# Patient Record
Sex: FEMALE | Race: WHITE | NOT HISPANIC OR LATINO | Employment: FULL TIME
[De-identification: names, ages, dates, MRNs, and addresses within clinical notes are randomized per-mention and may not be internally consistent; named-entity substitution may affect disease eponyms.]

---

## 2017-10-15 ENCOUNTER — HEALTH MAINTENANCE LETTER (OUTPATIENT)
Age: 27
End: 2017-10-15

## 2018-09-06 ENCOUNTER — TELEPHONE (OUTPATIENT)
Dept: FAMILY MEDICINE | Facility: CLINIC | Age: 28
End: 2018-09-06

## 2018-09-06 NOTE — TELEPHONE ENCOUNTER
Patient is calling stating just picked up records/copy of immunization record and upon getting home looked at the print out and only had 1 HPV shot for record. Would like to see if RN or care team can look into immunization record to see if that is correct or if poss missing pages. Please call to discuss. Thank you.

## 2018-09-06 NOTE — TELEPHONE ENCOUNTER
Got immunizations off of [facility], put in chart. Called patient and informed her what we had from [facility]. She is looking for her MMR and chicken pox vaccines for her employment. She will keep checking to get dates.Fozia Orellana MA/JAY

## 2018-10-22 ENCOUNTER — HEALTH MAINTENANCE LETTER (OUTPATIENT)
Age: 28
End: 2018-10-22

## 2019-11-04 ENCOUNTER — OFFICE VISIT (OUTPATIENT)
Dept: OPTOMETRY | Facility: CLINIC | Age: 29
End: 2019-11-04
Payer: COMMERCIAL

## 2019-11-04 DIAGNOSIS — Z01.00 ENCOUNTER FOR EXAMINATION OF EYES AND VISION WITHOUT ABNORMAL FINDINGS: Primary | ICD-10-CM

## 2019-11-04 DIAGNOSIS — Z46.0 CONTACT LENS/GLASSES FITTING: ICD-10-CM

## 2019-11-04 DIAGNOSIS — H52.13 MYOPIA OF BOTH EYES: ICD-10-CM

## 2019-11-04 PROCEDURE — 92310 CONTACT LENS FITTING OU: CPT | Mod: GA | Performed by: OPTOMETRIST

## 2019-11-04 PROCEDURE — 92004 COMPRE OPH EXAM NEW PT 1/>: CPT | Performed by: OPTOMETRIST

## 2019-11-04 PROCEDURE — 92015 DETERMINE REFRACTIVE STATE: CPT | Performed by: OPTOMETRIST

## 2019-11-04 ASSESSMENT — SLIT LAMP EXAM - LIDS
COMMENTS: NORMAL
COMMENTS: NORMAL

## 2019-11-04 ASSESSMENT — VISUAL ACUITY
OS_SC: 20/20
OD_SC: 20/50
OD_CC: 20/20
OS_SC: 20/70
OD_SC+: -2
METHOD: SNELLEN - LINEAR
OS_CC: 20/20
OD_CC: 20/20
OS_SC+: -2
OS_CC: 20/20
OD_SC: 20/20 -1
CORRECTION_TYPE: GLASSES

## 2019-11-04 ASSESSMENT — REFRACTION_CURRENTRX
OD_BRAND: J&J ACUVUE OASYS BC 8.4, D 14.0
OD_SPHERE: -1.00
OS_BRAND: J&J ACUVUE OASYS BC 8.4, D 14.0
OS_SPHERE: -1.25

## 2019-11-04 ASSESSMENT — REFRACTION_MANIFEST
OD_CYLINDER: SPHERE
OD_SPHERE: -1.00
OS_SPHERE: -1.25
OS_CYLINDER: SPHERE

## 2019-11-04 ASSESSMENT — REFRACTION_WEARINGRX
OD_SPHERE: -0.75
OS_SPHERE: -1.00
SPECS_TYPE: SVL
OD_CYLINDER: SPHERE
OS_CYLINDER: SPHERE

## 2019-11-04 ASSESSMENT — TONOMETRY
OD_IOP_MMHG: 13
OS_IOP_MMHG: 13
IOP_METHOD: APPLANATION

## 2019-11-04 ASSESSMENT — CONF VISUAL FIELD
OD_NORMAL: 1
OS_NORMAL: 1

## 2019-11-04 ASSESSMENT — EXTERNAL EXAM - RIGHT EYE: OD_EXAM: NORMAL

## 2019-11-04 ASSESSMENT — EXTERNAL EXAM - LEFT EYE: OS_EXAM: NORMAL

## 2019-11-04 NOTE — PATIENT INSTRUCTIONS
Begin trial of Acuvue Oasys lenses.   Do not sleep in contact lenses. Health risks discussed.  Do not swim in contact lenses . Health risks discussed.  Only wear contact lenses if they are comfortable and eyes are not red .  Use new contact solution in case every day.  Maximum contact wearing time of 12 hours per day.  If adequate comfort/vision with contact lenses, we can finalize the prescription. If not, return for contact lens follow-up appointment.     Copy of glasses Rx provided today.  Optional to fill- mild change.     Maintain annual eye exams.     The effects of the dilating drops last for 4- 6 hours.  You will be more sensitive to light and vision will be blurry up close.  Mydriatic sunglasses were given if needed.      Francisco Gill O.D.  [address]. [place]  [address]    [phone]

## 2019-11-04 NOTE — LETTER
11/4/2019         RE: Bettye Sagastume  [address] [place] [address]        Dear Colleague,    Thank you for referring your patient, Bettye Sagastume, to the [facility]. Please see a copy of my visit note below.    Chief Complaint   Patient presents with     Annual Eye Exam      Previous contact lens wearer? Yes  Comfort of contact lenses :Good, dry at times  Satisfied with current lenses: Yes, patient currently wears 2 week lens    Accompanied by self  Last Eye Exam: 2.5 years ago  Dilated Previously: Yes    What are you currently using to see?  Glasses-2.5 years old       Distance Vision Acuity: Satisfied with vision    Near Vision Acuity: Satisfied with vision while reading  unaided    Eye Comfort: good  Do you use eye drops? : No  Occupation or Hobbies: emergency room tech    Lila Eisenberg, Optometric Tech          Medical, surgical and family histories reviewed and updated 11/4/2019.       OBJECTIVE: See Ophthalmology exam    ASSESSMENT:    ICD-10-CM    1. Encounter for examination of eyes and vision without abnormal findings Z01.00    2. Myopia of both eyes H52.13    3. Contact lens/glasses fitting Z46.0       PLAN:     Patient Instructions   Begin trial of Acuvue Oasys lenses.   Do not sleep in contact lenses. Health risks discussed.  Do not swim in contact lenses . Health risks discussed.  Only wear contact lenses if they are comfortable and eyes are not red .  Use new contact solution in case every day.  Maximum contact wearing time of 12 hours per day.  If adequate comfort/vision with contact lenses, we can finalize the prescription. If not, return for contact lens follow-up appointment.     Copy of glasses Rx provided today.  Optional to fill- mild change.     Maintain annual eye exams.     The effects of the dilating drops last for 4- 6 hours.  You will be more sensitive to light and vision will be blurry up close.  Mydriatic sunglasses were given if needed.      Francisco ACKERMAN  TAYLER Gill.  [address]  [place]  [address]    [phone]           Again, thank you for allowing me to participate in the care of your patient.        Sincerely,        Francisco Gill, OD

## 2019-11-04 NOTE — PROGRESS NOTES
Chief Complaint   Patient presents with     Annual Eye Exam      Previous contact lens wearer? Yes  Comfort of contact lenses :Good, dry at times  Satisfied with current lenses: Yes, patient currently wears 2 week lens    Accompanied by self  Last Eye Exam: 2.5 years ago  Dilated Previously: Yes    What are you currently using to see?  Glasses-2.5 years old       Distance Vision Acuity: Satisfied with vision    Near Vision Acuity: Satisfied with vision while reading  unaided    Eye Comfort: good  Do you use eye drops? : No  Occupation or Hobbies: emergency room tech    Lila Eisenberg, Optometric Tech          Medical, surgical and family histories reviewed and updated 11/4/2019.       OBJECTIVE: See Ophthalmology exam    ASSESSMENT:    ICD-10-CM    1. Encounter for examination of eyes and vision without abnormal findings Z01.00    2. Myopia of both eyes H52.13    3. Contact lens/glasses fitting Z46.0       PLAN:     Patient Instructions   Begin trial of Acuvue Oasys lenses.   Do not sleep in contact lenses. Health risks discussed.  Do not swim in contact lenses . Health risks discussed.  Only wear contact lenses if they are comfortable and eyes are not red .  Use new contact solution in case every day.  Maximum contact wearing time of 12 hours per day.  If adequate comfort/vision with contact lenses, we can finalize the prescription. If not, return for contact lens follow-up appointment.     Copy of glasses Rx provided today.  Optional to fill- mild change.     Maintain annual eye exams.     The effects of the dilating drops last for 4- 6 hours.  You will be more sensitive to light and vision will be blurry up close.  Mydriatic sunglasses were given if needed.      Francisco Gill O.D.  [address] [place]  [address]    [phone]

## 2019-12-18 ENCOUNTER — PRENATAL OFFICE VISIT (OUTPATIENT)
Dept: OBGYN | Facility: OTHER | Age: 29
End: 2019-12-18
Payer: COMMERCIAL

## 2019-12-18 VITALS
SYSTOLIC BLOOD PRESSURE: 120 MMHG | BODY MASS INDEX: 23.88 KG/M2 | DIASTOLIC BLOOD PRESSURE: 70 MMHG | WEIGHT: 129.75 LBS | HEART RATE: 64 BPM | HEIGHT: 62 IN

## 2019-12-18 DIAGNOSIS — Z11.3 ROUTINE SCREENING FOR STI (SEXUALLY TRANSMITTED INFECTION): ICD-10-CM

## 2019-12-18 DIAGNOSIS — Z12.4 SCREENING FOR MALIGNANT NEOPLASM OF CERVIX: ICD-10-CM

## 2019-12-18 DIAGNOSIS — Z00.00 ROUTINE GENERAL MEDICAL EXAMINATION AT A HEALTH CARE FACILITY: Primary | ICD-10-CM

## 2019-12-18 PROCEDURE — 87591 N.GONORRHOEAE DNA AMP PROB: CPT | Performed by: ADVANCED PRACTICE MIDWIFE

## 2019-12-18 PROCEDURE — 87491 CHLMYD TRACH DNA AMP PROBE: CPT | Performed by: ADVANCED PRACTICE MIDWIFE

## 2019-12-18 PROCEDURE — 36415 COLL VENOUS BLD VENIPUNCTURE: CPT | Performed by: ADVANCED PRACTICE MIDWIFE

## 2019-12-18 PROCEDURE — 86780 TREPONEMA PALLIDUM: CPT | Performed by: ADVANCED PRACTICE MIDWIFE

## 2019-12-18 PROCEDURE — 87389 HIV-1 AG W/HIV-1&-2 AB AG IA: CPT | Performed by: ADVANCED PRACTICE MIDWIFE

## 2019-12-18 PROCEDURE — 99385 PREV VISIT NEW AGE 18-39: CPT | Performed by: ADVANCED PRACTICE MIDWIFE

## 2019-12-18 PROCEDURE — G0145 SCR C/V CYTO,THINLAYER,RESCR: HCPCS | Performed by: ADVANCED PRACTICE MIDWIFE

## 2019-12-18 PROCEDURE — 87624 HPV HI-RISK TYP POOLED RSLT: CPT | Performed by: ADVANCED PRACTICE MIDWIFE

## 2019-12-18 ASSESSMENT — PATIENT HEALTH QUESTIONNAIRE - PHQ9
10. IF YOU CHECKED OFF ANY PROBLEMS, HOW DIFFICULT HAVE THESE PROBLEMS MADE IT FOR YOU TO DO YOUR WORK, TAKE CARE OF THINGS AT HOME, OR GET ALONG WITH OTHER PEOPLE: NOT DIFFICULT AT ALL
SUM OF ALL RESPONSES TO PHQ QUESTIONS 1-9: 7
SUM OF ALL RESPONSES TO PHQ QUESTIONS 1-9: 7

## 2019-12-18 ASSESSMENT — MIFFLIN-ST. JEOR: SCORE: 1253.85
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
## 2019-12-18 NOTE — LETTER
December 26, 2019    Bettye Sagastume  [address] [place] [address]    Dear ,  This letter is regarding your recent Pap smear (cervical cancer screening) and Human Papillomavirus (HPV) test.  We are happy to inform you that your Pap smear result is normal. Cervical cancer is closely linked with certain types of HPV. Your results showed no evidence of high-risk HPV.  We recommend you have your next PAP smear and HPV test in 5 years.  You will still need to return to the clinic every year for an annual exam and other preventive tests.  If you have additional questions regarding this result, please call our registered nurse, Karina at [phone].  Sincerely,    WILBUR Carreno CNM //walter

## 2019-12-18 NOTE — NURSING NOTE
"Chief Complaint   Patient presents with     Physical       Initial /70 (BP Location: Left arm, Patient Position: Sitting, Cuff Size: Adult Regular)   Pulse 64   Ht 1.562 m (5' 1.5\")   Wt 58.9 kg (129 lb 12 oz)   LMP 12/02/2019 (Approximate)   Breastfeeding Unknown   BMI 24.12 kg/m   Estimated body mass index is 24.12 kg/m  as calculated from the following:    Height as of this encounter: 1.562 m (5' 1.5\").    Weight as of this encounter: 58.9 kg (129 lb 12 oz).  Medication Reconciliation: complete    Destini Orozco CMA    "

## 2019-12-18 NOTE — PATIENT INSTRUCTIONS
Thank for choosing our clinic for your health care needs. Our goal is to provided you with excellent care. One way that we continue to improve our care is by listening to our patients. Please take a few minutes to complete the written survey that you may receive in the mail after your visit.     You may reach your care team by calling the following number:    [place]- [phone]  [place] [phone]  For clinic hours at [facility]  please visit the [facility] web site [email or web address]    Notification of your lab results:  Normal or non-critical lab and imaging results will be communicated to you by Mychart, letter, or phone within 7 days. If you do not hear from us within 10 days, please contact us through [employer] or phone. If you have a critical or abnormal lab result, we will notify you by phone as soon as possible.  Pap smears often take a bit longer.     After Hours nurse advice:  [facility]:  [phone]     Medication Refills:  Please contact your pharmacy and they will forward the refill to us. Please allow 3 business days for your refills to be completed.     Secure access to your medical record:  Use [employer] (secure email communication and access to your chart) to send your primary care provider a message or make an appointment. Ask someone on your Team how to sign up for [employer]. To log on to [employer] or for more information in [employer] please visit the website at [email or web address].            How to prevent CMV or cytomegalovirus infection while you are pregnant:    Thoroughly wash your hands with soap and warm water especially after doing things such as:  Diaper changes  Feeding or bathing a child  Wiping a child's runny nose or drool  Handling a child's toys    Do not share cups, plates, utensils, toothbrushes or food with your children  Do not kiss young children on the mouth or cheek. Instead, kiss them on the head or give them a hug.  Do not share towels or  washcloths with young children.  Clean toy, counter tops, and other surfaces that come in contact with urine or saliva.        LISTERIA  Individuals can reduce the risk for listeria contamination through proper food selection, handling, and storage, such as:    Rinsing raw produce (fuits and vegetables) before eating, cutting, or cooking;    Keeping refrigerators at 40 degrees F or lower;    Buying soft cheeses only if their labels state that they are made with pasteurized milk.  Also avoiding cheese that has not been initially wrapped in plastic.  These cheeses include brie, camembert, blue and the soft Mexican cheeses like con queso.    Heating all food that can be heated but especially hot dogs, luncheon meats, and cold cuts to an internal temperature of 165 degrees F or until steaming hot before serving them; and    Washing your hands for at least 20 seconds with warm water and soap before and after handling cantaloupes or other melons.    Watch for food recalls for listeria and contact us if you believe you have been exposed.               First line remedies for nausea in pregnancy    Eat small frequent meals every 2-3 hours if possible.   Avoid food at extremes of temparture and drinks with carbination.  Eat foods that appeal to you, avoiding fats and spicy foods.  Bread, pasta, crackers, potatoes, and rice tend to be tolerated the best.  Don't worry about what you eat in the first 3 months, it is more important that you can eat and keep it down.   Try flat ginger ale.  Ginger is a herbal remedy for nausea and you can use it in any form.  There are ginger tablets you can purchase.  The dose is 500 to 1000 mg a day.   You may also try doxylamine 12.5 mg three times a day which is a sleeping medication along with Vitamin B6 25 mg three times a day.  This combination takes up to a week to work so give it some time.  Be sure to take both the doxylamine and B6  Doxylamine is sometimes called Unisom and it comes in  25 mg tablets so you will have to break it in half and take half a tablet.   It is important to take the Unisom and B6 together or it won't be effective.  If you begin to vomit more than 5 or 6 times a day and feel that you are unable to keep anything down, call the clinic for an appointment to be seen.                Fruits and vegetables high in pesticide contamination  Strawberries  Spinach  Kale  Nectarines  Peaches  Apples  Grapes  Cherries  Pears  Tomatoes  Celery  Potatoes  Hot Peppers.     Consider extra washing of these fruits and vegetables, peeling if possible or purchasing organics.     Fruits and vegetables lowest if pesticide contramination:  Avocado  Sweet corn  Pineapple  Cabbage   Onions   Frozen peas  Papaya  Asparagus  Mushrooms  Eggplant  Honeydew  Kiwi  Cantaloupe  Cauliflower  Broccoli      Consider eliminating or reducing the following additives in personal care products and make up:  Triclosan  Paraben  Phthalates  Phenols  Products that do not contain these additives are often found in the organic or green sections of stores.       Preventive Health Recommendations  Female Ages 26 - 39  Yearly exam:   See your health care provider every year in order to    Review health changes.     Discuss preventive care.      Review your medicines if you your doctor has prescribed any.    Until age 30: Get a Pap test every three years (more often if you have had an abnormal result).    After age 30: Talk to your doctor about whether you should have a Pap test every 3 years or have a Pap test with HPV screening every 5 years.   You do not need a Pap test if your uterus was removed (hysterectomy) and you have not had cancer.  You should be tested each year for STDs (sexually transmitted diseases), if you're at risk.   Talk to your provider about how often to have your cholesterol checked.  If you are at risk for diabetes, you should have a diabetes test (fasting glucose).  Shots: Get a flu shot each year.  Get a tetanus shot every 10 years.   Nutrition:     Eat at least 5 servings of fruits and vegetables each day.    Eat whole-grain bread, whole-wheat pasta and brown rice instead of white grains and rice.    Get adequate Calcium and Vitamin D.     Lifestyle    Exercise at least 150 minutes a week (30 minutes a day, 5 days of the week). This will help you control your weight and prevent disease.    Limit alcohol to one drink per day.    No smoking.     Wear sunscreen to prevent skin cancer.    See your dentist every six months for an exam and cleaning.

## 2019-12-19 LAB
C TRACH DNA SPEC QL NAA+PROBE: NEGATIVE
HIV 1+2 AB+HIV1 P24 AG SERPL QL IA: NONREACTIVE
N GONORRHOEA DNA SPEC QL NAA+PROBE: NEGATIVE
SPECIMEN SOURCE: NORMAL
SPECIMEN SOURCE: NORMAL
T PALLIDUM AB SER QL: NONREACTIVE

## 2019-12-19 ASSESSMENT — PATIENT HEALTH QUESTIONNAIRE - PHQ9: SUM OF ALL RESPONSES TO PHQ QUESTIONS 1-9: 7

## 2019-12-20 LAB
COPATH REPORT: NORMAL
PAP: NORMAL

## 2019-12-24 LAB
FINAL DIAGNOSIS: NORMAL
HPV HR 12 DNA CVX QL NAA+PROBE: NEGATIVE
HPV16 DNA SPEC QL NAA+PROBE: NEGATIVE
HPV18 DNA SPEC QL NAA+PROBE: NEGATIVE
SPECIMEN DESCRIPTION: NORMAL
SPECIMEN SOURCE CVX/VAG CYTO: NORMAL

## 2020-07-08 ENCOUNTER — ANESTHESIA EVENT (OUTPATIENT)
Dept: SURGERY | Facility: CLINIC | Age: 30
End: 2020-07-08
Payer: COMMERCIAL

## 2020-07-08 ENCOUNTER — OFFICE VISIT (OUTPATIENT)
Dept: OBGYN | Facility: CLINIC | Age: 30
End: 2020-07-08
Payer: COMMERCIAL

## 2020-07-08 ENCOUNTER — ANESTHESIA (OUTPATIENT)
Dept: SURGERY | Facility: CLINIC | Age: 30
End: 2020-07-08
Payer: COMMERCIAL

## 2020-07-08 ENCOUNTER — HOSPITAL ENCOUNTER (OUTPATIENT)
Facility: CLINIC | Age: 30
Discharge: HOME | End: 2020-07-08
Attending: OBSTETRICS & GYNECOLOGY | Admitting: OBSTETRICS & GYNECOLOGY
Payer: COMMERCIAL

## 2020-07-08 VITALS
BODY MASS INDEX: 24.98 KG/M2 | SYSTOLIC BLOOD PRESSURE: 115 MMHG | WEIGHT: 134.38 LBS | DIASTOLIC BLOOD PRESSURE: 82 MMHG | TEMPERATURE: 98.2 F | HEART RATE: 70 BPM

## 2020-07-08 VITALS
SYSTOLIC BLOOD PRESSURE: 125 MMHG | WEIGHT: 133.6 LBS | HEIGHT: 61 IN | HEART RATE: 55 BPM | RESPIRATION RATE: 16 BRPM | BODY MASS INDEX: 25.22 KG/M2 | OXYGEN SATURATION: 100 % | DIASTOLIC BLOOD PRESSURE: 73 MMHG | TEMPERATURE: 97.5 F

## 2020-07-08 DIAGNOSIS — N90.89 HEMATOMA OF LABIA MAJORA: Primary | ICD-10-CM

## 2020-07-08 DIAGNOSIS — S39.83XD: Primary | ICD-10-CM

## 2020-07-08 DIAGNOSIS — N90.89 HEMATOMA OF LABIA MAJORA: ICD-10-CM

## 2020-07-08 LAB
GLUCOSE BLDC GLUCOMTR-MCNC: 100 MG/DL (ref 70–99)
HCG UR QL: NEGATIVE
SARS-COV-2 PCR COMMENT: NORMAL
SARS-COV-2 RNA SPEC QL NAA+PROBE: NEGATIVE
SARS-COV-2 RNA SPEC QL NAA+PROBE: NORMAL
SPECIMEN SOURCE: NORMAL
SPECIMEN SOURCE: NORMAL

## 2020-07-08 PROCEDURE — 25000125 ZZHC RX 250: Performed by: NURSE ANESTHETIST, CERTIFIED REGISTERED

## 2020-07-08 PROCEDURE — 36000053 ZZH SURGERY LEVEL 2 EA 15 ADDTL MIN - UMMC: Performed by: OBSTETRICS & GYNECOLOGY

## 2020-07-08 PROCEDURE — 25800030 ZZH RX IP 258 OP 636: Performed by: ANESTHESIOLOGY

## 2020-07-08 PROCEDURE — 81025 URINE PREGNANCY TEST: CPT | Performed by: ANESTHESIOLOGY

## 2020-07-08 PROCEDURE — 25000128 H RX IP 250 OP 636: Performed by: NURSE ANESTHETIST, CERTIFIED REGISTERED

## 2020-07-08 PROCEDURE — U0003 INFECTIOUS AGENT DETECTION BY NUCLEIC ACID (DNA OR RNA); SEVERE ACUTE RESPIRATORY SYNDROME CORONAVIRUS 2 (SARS-COV-2) (CORONAVIRUS DISEASE [COVID-19]), AMPLIFIED PROBE TECHNIQUE, MAKING USE OF HIGH THROUGHPUT TECHNOLOGIES AS DESCRIBED BY CMS-2020-01-R: HCPCS | Performed by: OBSTETRICS & GYNECOLOGY

## 2020-07-08 PROCEDURE — 25000128 H RX IP 250 OP 636: Performed by: ANESTHESIOLOGY

## 2020-07-08 PROCEDURE — 40000170 ZZH STATISTIC PRE-PROCEDURE ASSESSMENT II: Performed by: OBSTETRICS & GYNECOLOGY

## 2020-07-08 PROCEDURE — 36000051 ZZH SURGERY LEVEL 2 1ST 30 MIN - UMMC: Performed by: OBSTETRICS & GYNECOLOGY

## 2020-07-08 PROCEDURE — 99213 OFFICE O/P EST LOW 20 MIN: CPT | Performed by: ADVANCED PRACTICE MIDWIFE

## 2020-07-08 PROCEDURE — 71000027 ZZH RECOVERY PHASE 2 EACH 15 MINS: Performed by: OBSTETRICS & GYNECOLOGY

## 2020-07-08 PROCEDURE — 37000009 ZZH ANESTHESIA TECHNICAL FEE, EACH ADDTL 15 MIN: Performed by: OBSTETRICS & GYNECOLOGY

## 2020-07-08 PROCEDURE — 27210794 ZZH OR GENERAL SUPPLY STERILE: Performed by: OBSTETRICS & GYNECOLOGY

## 2020-07-08 PROCEDURE — 37000008 ZZH ANESTHESIA TECHNICAL FEE, 1ST 30 MIN: Performed by: OBSTETRICS & GYNECOLOGY

## 2020-07-08 PROCEDURE — 82962 GLUCOSE BLOOD TEST: CPT

## 2020-07-08 PROCEDURE — 71000014 ZZH RECOVERY PHASE 1 LEVEL 2 FIRST HR: Performed by: OBSTETRICS & GYNECOLOGY

## 2020-07-08 PROCEDURE — 25000132 ZZH RX MED GY IP 250 OP 250 PS 637: Performed by: ANESTHESIOLOGY

## 2020-07-08 PROCEDURE — 10140 I&D HMTMA SEROMA/FLUID COLLJ: CPT | Mod: 22 | Performed by: OBSTETRICS & GYNECOLOGY

## 2020-07-08 RX ORDER — SODIUM CHLORIDE, SODIUM LACTATE, POTASSIUM CHLORIDE, CALCIUM CHLORIDE 600; 310; 30; 20 MG/100ML; MG/100ML; MG/100ML; MG/100ML
INJECTION, SOLUTION INTRAVENOUS CONTINUOUS
Status: DISCONTINUED | OUTPATIENT
Start: 2020-07-08 | End: 2020-07-08 | Stop reason: HOSPADM

## 2020-07-08 RX ORDER — IBUPROFEN 600 MG/1
600 TABLET, FILM COATED ORAL EVERY 6 HOURS PRN
COMMUNITY
End: 2020-08-03

## 2020-07-08 RX ORDER — AMOXICILLIN 250 MG
2 CAPSULE ORAL 2 TIMES DAILY PRN
Qty: 60 TABLET | Refills: 0 | Status: SHIPPED | OUTPATIENT
Start: 2020-07-08 | End: 2020-08-03

## 2020-07-08 RX ORDER — PROPOFOL 10 MG/ML
INJECTION, EMULSION INTRAVENOUS PRN
Status: DISCONTINUED | OUTPATIENT
Start: 2020-07-08 | End: 2020-07-08

## 2020-07-08 RX ORDER — ONDANSETRON 2 MG/ML
4 INJECTION INTRAMUSCULAR; INTRAVENOUS EVERY 30 MIN PRN
Status: DISCONTINUED | OUTPATIENT
Start: 2020-07-08 | End: 2020-07-08 | Stop reason: HOSPADM

## 2020-07-08 RX ORDER — ONDANSETRON 2 MG/ML
INJECTION INTRAMUSCULAR; INTRAVENOUS PRN
Status: DISCONTINUED | OUTPATIENT
Start: 2020-07-08 | End: 2020-07-08

## 2020-07-08 RX ORDER — PROPOFOL 10 MG/ML
INJECTION, EMULSION INTRAVENOUS CONTINUOUS PRN
Status: DISCONTINUED | OUTPATIENT
Start: 2020-07-08 | End: 2020-07-08

## 2020-07-08 RX ORDER — ACETAMINOPHEN 325 MG/1
975 TABLET ORAL ONCE
Status: COMPLETED | OUTPATIENT
Start: 2020-07-08 | End: 2020-07-08

## 2020-07-08 RX ORDER — FENTANYL CITRATE 50 UG/ML
25-50 INJECTION, SOLUTION INTRAMUSCULAR; INTRAVENOUS
Status: DISCONTINUED | OUTPATIENT
Start: 2020-07-08 | End: 2020-07-08 | Stop reason: HOSPADM

## 2020-07-08 RX ORDER — KETOROLAC TROMETHAMINE 30 MG/ML
INJECTION, SOLUTION INTRAMUSCULAR; INTRAVENOUS PRN
Status: DISCONTINUED | OUTPATIENT
Start: 2020-07-08 | End: 2020-07-08

## 2020-07-08 RX ORDER — LIDOCAINE 40 MG/G
CREAM TOPICAL
Status: DISCONTINUED | OUTPATIENT
Start: 2020-07-08 | End: 2020-07-08 | Stop reason: HOSPADM

## 2020-07-08 RX ORDER — FENTANYL CITRATE 50 UG/ML
INJECTION, SOLUTION INTRAMUSCULAR; INTRAVENOUS PRN
Status: DISCONTINUED | OUTPATIENT
Start: 2020-07-08 | End: 2020-07-08

## 2020-07-08 RX ORDER — BUPIVACAINE HYDROCHLORIDE 7.5 MG/ML
INJECTION, SOLUTION INTRASPINAL PRN
Status: DISCONTINUED | OUTPATIENT
Start: 2020-07-08 | End: 2020-07-08

## 2020-07-08 RX ORDER — MEPERIDINE HYDROCHLORIDE 25 MG/ML
12.5 INJECTION INTRAMUSCULAR; INTRAVENOUS; SUBCUTANEOUS
Status: DISCONTINUED | OUTPATIENT
Start: 2020-07-08 | End: 2020-07-08 | Stop reason: HOSPADM

## 2020-07-08 RX ORDER — CEFAZOLIN SODIUM 1 G/3ML
INJECTION, POWDER, FOR SOLUTION INTRAMUSCULAR; INTRAVENOUS PRN
Status: DISCONTINUED | OUTPATIENT
Start: 2020-07-08 | End: 2020-07-08

## 2020-07-08 RX ORDER — OXYCODONE AND ACETAMINOPHEN 5; 325 MG/1; MG/1
1 TABLET ORAL EVERY 6 HOURS PRN
COMMUNITY
End: 2020-08-03

## 2020-07-08 RX ORDER — NALOXONE HYDROCHLORIDE 0.4 MG/ML
.1-.4 INJECTION, SOLUTION INTRAMUSCULAR; INTRAVENOUS; SUBCUTANEOUS
Status: DISCONTINUED | OUTPATIENT
Start: 2020-07-08 | End: 2020-07-08 | Stop reason: HOSPADM

## 2020-07-08 RX ORDER — LIDOCAINE HYDROCHLORIDE 20 MG/ML
INJECTION, SOLUTION INFILTRATION; PERINEURAL PRN
Status: DISCONTINUED | OUTPATIENT
Start: 2020-07-08 | End: 2020-07-08

## 2020-07-08 RX ORDER — ONDANSETRON 4 MG/1
4 TABLET, ORALLY DISINTEGRATING ORAL EVERY 30 MIN PRN
Status: DISCONTINUED | OUTPATIENT
Start: 2020-07-08 | End: 2020-07-08 | Stop reason: HOSPADM

## 2020-07-08 RX ADMIN — BUPIVACAINE HYDROCHLORIDE IN DEXTROSE 1.4 ML: 7.5 INJECTION, SOLUTION SUBARACHNOID at 16:15

## 2020-07-08 RX ADMIN — MIDAZOLAM 1 MG: 1 INJECTION INTRAMUSCULAR; INTRAVENOUS at 16:00

## 2020-07-08 RX ADMIN — PROPOFOL 100 MCG/KG/MIN: 10 INJECTION, EMULSION INTRAVENOUS at 16:17

## 2020-07-08 RX ADMIN — SODIUM CHLORIDE, POTASSIUM CHLORIDE, SODIUM LACTATE AND CALCIUM CHLORIDE: 600; 310; 30; 20 INJECTION, SOLUTION INTRAVENOUS at 16:00

## 2020-07-08 RX ADMIN — KETOROLAC TROMETHAMINE 30 MG: 30 INJECTION, SOLUTION INTRAMUSCULAR at 16:55

## 2020-07-08 RX ADMIN — PROPOFOL 20 MG: 10 INJECTION, EMULSION INTRAVENOUS at 16:20

## 2020-07-08 RX ADMIN — LIDOCAINE HYDROCHLORIDE 40 MG: 20 INJECTION, SOLUTION INFILTRATION; PERINEURAL at 16:17

## 2020-07-08 RX ADMIN — PROPOFOL 50 MG: 10 INJECTION, EMULSION INTRAVENOUS at 16:17

## 2020-07-08 RX ADMIN — CEFAZOLIN 2 G: 1 INJECTION, POWDER, FOR SOLUTION INTRAMUSCULAR; INTRAVENOUS at 16:34

## 2020-07-08 RX ADMIN — FENTANYL CITRATE 50 MCG: 50 INJECTION, SOLUTION INTRAMUSCULAR; INTRAVENOUS at 16:00

## 2020-07-08 RX ADMIN — ACETAMINOPHEN 975 MG: 325 TABLET, FILM COATED ORAL at 19:23

## 2020-07-08 RX ADMIN — ONDANSETRON 4 MG: 2 INJECTION INTRAMUSCULAR; INTRAVENOUS at 16:58

## 2020-07-08 RX ADMIN — MIDAZOLAM 1 MG: 1 INJECTION INTRAMUSCULAR; INTRAVENOUS at 16:28

## 2020-07-08 ASSESSMENT — MIFFLIN-ST. JEOR: SCORE: 1258.38

## 2020-07-08 NOTE — OP NOTE
GYNECOLOGY OPERATIVE NOTE     Date of Surgery: July 8, 2020  Preop Dx:    1. Hematoma of labia majora  Postop Dx:     Same as above, now s/p evacuation of labial hematoma  Procedure:     1. Evacuation of labial hematoma    Surgeon:  Samantha Martinez MD  Assistants:   MD Yadira Navarro MD    Anesthesia:  Spinal, MAC  EBL:  80 ml  UOP:  150 mL  Drains: None  Specimens: None  Complications: None apparent      Findings:    Exam under anesthesia revealed a L labial hematoma measuring about 7x5 cm with significant surrounding bruising at the level of the vaginal introitus. A esquivel catheter was in place prior to her arrival today. The hematoma was fluctuant and had a ~5 mm area of thin clot through which a pinky finger was able to pass. 70 mL of dark, clotted blood was suctioned through the 5 mm hole. The internal cavity of the hematoma measured about 6x4x3 cm.      Indications:    Bettye Sagastume is a 30 year old woman with left labial hematoma  who presents for evacuation of the hematoma. Risks and benefits discussed with patient including risk of blood loss, infection, and damage to surrounding structures. Patient had all of her questions answered. Written informed consent obtained.      OPERATIVE PROCEDURE:    The consent was reviewed with the patient in the preoperative setting and confirmed. She was transferred to the operating room and placed in the right lateral decubitus position. Spinal anesthesia was administered in the usual manner. She was then placed in the dorsal supine position. MAC anesthesia was administered in the usual manner. She was then placed in the dorsal lithotomy position in the yellow fin stirrups. A time out was performed to confirm the patient and the procedure. An exam under anesthesia was performed with the findings documented above. The patient was then prepped and draped in the normal sterile fashion.  A suction was passed through the 5 mm hole in the skin that was  covered by a clot. 70 mL of dark, clotted blood was suctioned out of the hematoma cavity. The hole was expanded anteriorly and posteriorly with a scalpel. The cavity was irrigated with 100 mL of sterile saline. Cautery was used to achieve hemostasis. A running, locked stitch with 2-0 vicryl was used to bring together the walls of the cavity. A second layer was placed to approximate the muscle. The skin was closed with 3-0 vicryl simple, interrupted stitches.  The esquivel catheter was removed. The patient tolerated the procedure well. Sponge, lap, and needle counts were correct x2. She received 2g of Ancef for antibiotics. She was taken to the PACU in stable condition.    Dr. Martinez was present and scrubbed for the entire procedure.      Yadira Reyes MD  Obstetrics and Gynecology, PGY-1  July 8, 2020, 5:32 PM     I was present and scrubbed for entirety of the surgical case and  agree with note as edited to reflect findings.      ANGELITO MARTINEZ MD

## 2020-07-08 NOTE — ANESTHESIA CARE TRANSFER NOTE
Patient: Bettye Sagastume    Procedure(s):  Evacuation of Labial Hematoma    Diagnosis: Hematoma of labia majora [N90.89]  Diagnosis Additional Information: No value filed.    Anesthesia Type:   Spinal, MAC     Note:  Airway :Room Air  Patient transferred to:PACU  Handoff Report: Identifed the Patient, Identified the Reponsible Provider, Reviewed the pertinent medical history, Discussed the surgical course, Reviewed Intra-OP anesthesia mangement and issues during anesthesia, Set expectations for post-procedure period and Allowed opportunity for questions and acknowledgement of understanding      Vitals: (Last set prior to Anesthesia Care Transfer)    CRNA VITALS  7/8/2020 1639 - 7/8/2020 1716      7/8/2020             NIBP:  104/60    Pulse:  64    NIBP Mean:  66    Temp:  36.3  C (97.3  F)    SpO2:  100 %    Resp Rate (observed):  14                Electronically Signed By: WILBUR Wade CRNA  July 8, 2020  5:16 PM

## 2020-07-08 NOTE — PROGRESS NOTES
Bettye Sagastume is a 30 year old who presents to the clinic for evaluation of labial hematoma.  On , fell from a dock an injured her labia.  Was seen at a [facility] facility, laceration was repaired and a esquivel cath placed due to edema.  Was told to return in a week for reevaluation and removal of catheter.   She is now 4 days post fall.   She is managing her pain with narcotics.  Is unable to sit due to pain and swelling.  She has bleeding and discharge that she is not sure where it is coming from.  No fever or chills.  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      Histories reviewed and updated  Past Medical History:   Diagnosis Date     NO ACTIVE PROBLEMS      Past Surgical History:   Procedure Laterality Date     AS REPAIR RECTO-VAG FISTULA       COSMETIC SURGERY      breast reduction     epesiotomy revision       GYN SURGERY       x1     GYN SURGERY      tubal ligation     Social History     Socioeconomic History     Marital status: Significant other     Spouse name: Not on file     Number of children: Not on file     Years of education: Not on file     Highest education level: Not on file   Occupational History     Not on file   Social Needs     Financial resource strain: Not on file     Food insecurity     Worry: Not on file     Inability: Not on file     Transportation needs     Medical: Not on file     Non-medical: Not on file   Tobacco Use     Smoking status: Former Smoker     Packs/day: 0.50     Types: Cigarettes     Last attempt to quit: 2011     Years since quittin.3     Smokeless tobacco: Never Used   Substance and Sexual Activity     Alcohol use: Yes     Drug use: No     Sexual activity: Yes     Partners: Male     Birth control/protection: Female Surgical   Lifestyle     Physical activity     Days per week: Not on file     Minutes per session: Not on file     Stress: Not on file   Relationships     Social connections     Talks on phone: Not on file     Gets together: Not on file     Attends  Nondenominational service: Not on file     Active member of club or organization: Not on file     Attends meetings of clubs or organizations: Not on file     Relationship status: Not on file     Intimate partner violence     Fear of current or ex partner: Not on file     Emotionally abused: Not on file     Physically abused: Not on file     Forced sexual activity: Not on file   Other Topics Concern     Parent/sibling w/ CABG, MI or angioplasty before 65F 55M? Not Asked   Social History Narrative     Not on file     Family History   Problem Relation Age of Onset     Hypertension Father      Lipids Father      Cancer Maternal Grandmother         lymphoma     Heart Disease Paternal Grandfather         MI     Glaucoma No family hx of      Macular Degeneration No family hx of           ROS: 10 point ROS neg other than the symptoms noted above in the HPI.    EXAM:  /82 (BP Location: Right arm, Patient Position: Sitting, Cuff Size: Adult Regular)   Pulse 70   Temp 98.2  F (36.8  C) (Oral)   Wt 61 kg (134 lb 6 oz)   LMP 06/21/2020 (Approximate)   BMI 24.98 kg/m    GENERAL:  Pleasant female, in obvious pain, unable to sit walking is difficult  EYES: sclera clear  RESP: breathing unlabored  CV: extremities without edema  M/S: no gross deformities  Neuro:  normal strength and sensation  : PELVIC EXAM:  There is significant bruising from the left upper thigh/gron, across the mons and down the labia to the rectum.   There is a esquivel cath in place.   The groin is soft as is the upper 25% of the left labia.  The lower portion of the labia is firm and exquisitely painful to touch.   The area of the hematoma that can be assessed without causing significant pain is approximately 6x5 cm.  Unable to determine how far it extends internally.  There is no significant blood or drainage noted.    Psych:  alert, with normal speech and behavior        ASSESSMENT/PLAN:  (S39.83XD) Pelvic straddle injury of soft tissues, subsequent  encounter  (primary encounter diagnosis)  Comment:   Plan:     (N90.89) Hematoma of labia majora  Comment:   Plan:         Pt prefers evacuation of the hematoma in an attempt to relieve her pain and get back to work sooner.   Understands that the area may re accumulate with either blood or serous drainage.             Visit length 30 minutes was spent face to face with the patient today discussing her history, diagnosis, and follow-up plan as noted above.  Over 50% of the visit was spent in counseling and coordination of care.    Time noted does not include time required to complete procedures.

## 2020-07-08 NOTE — ANESTHESIA PREPROCEDURE EVALUATION
"Anesthesia Pre-Procedure Evaluation    Patient: Bettye Sagastume   MRN:     [id number] Gender:   female   Age:    30 year old :      1990        Preoperative Diagnosis: Hematoma of labia majora [N90.89]   Procedure(s):  Evacuation of Labial Hematoma     LABS:  CBC:   Lab Results   Component Value Date    WBC 7.0 2011    HGB 14.8 2011    HGB 12.9 2011    HCT 39.2 2011     2011     BMP: No results found for: NA, POTASSIUM, CHLORIDE, CO2, BUN, CR, GLC  COAGS: No results found for: PTT, INR, FIBR  POC:   Lab Results   Component Value Date     (H) 2020    HCG Negative 2020     OTHER: No results found for: PH, LACT, A1C, AARON, PHOS, MAG, ALBUMIN, PROTTOTAL, ALT, AST, GGT, ALKPHOS, BILITOTAL, BILIDIRECT, LIPASE, AMYLASE, DALE, TSH, T4, T3, CRP, SED     Preop Vitals    BP Readings from Last 3 Encounters:   20 127/84   20 115/82   19 120/70    Pulse Readings from Last 3 Encounters:   20 78   20 70   19 64      Resp Readings from Last 3 Encounters:   20 20   11    SpO2 Readings from Last 3 Encounters:   20 100%      Temp Readings from Last 1 Encounters:   20 36.6  C (97.9  F) (Oral)    Ht Readings from Last 1 Encounters:   20 1.549 m (5' 1\")      Wt Readings from Last 1 Encounters:   20 60.6 kg (133 lb 9.6 oz)    Estimated body mass index is 25.24 kg/m  as calculated from the following:    Height as of this encounter: 1.549 m (5' 1\").    Weight as of this encounter: 60.6 kg (133 lb 9.6 oz).     LDA:  Peripheral IV 20 Right Hand (Active)   Site Assessment WDL 20 1518   Line Status Saline locked 20 1518   Phlebitis Scale 0-->no symptoms 20 1518   Infiltration Scale 0 20 1518   Dressing Intervention New dressing  20 1518   Number of days: 0        Past Medical History:   Diagnosis Date     NO ACTIVE PROBLEMS       Past Surgical History:   Procedure Laterality " Date     AS REPAIR RECTO-VAG FISTULA       COSMETIC SURGERY      breast reduction     epesiotomy revision       GYN SURGERY       x1     GYN SURGERY      tubal ligation      Allergies   Allergen Reactions     Nkda [No Known Drug Allergies]         Anesthesia Evaluation     . Pt has had prior anesthetic.            ROS/MED HX    ENT/Pulmonary:       Neurologic:       Cardiovascular:         METS/Exercise Tolerance:     Hematologic:         Musculoskeletal:         GI/Hepatic:         Renal/Genitourinary:         Endo:         Psychiatric:         Infectious Disease:         Malignancy:         Other:                         PHYSICAL EXAM:   Mental Status/Neuro: A/A/O   Airway: Facies: Feasible  Mallampati: I  Mouth/Opening: Full  TM distance: > 6 cm  Neck ROM: Full   Respiratory: Auscultation: CTAB     Resp. Rate: Normal     Resp. Effort: Normal      CV: Rhythm: Regular  Rate: Age appropriate  Heart: Normal Sounds  Edema: None   Comments:      Dental: Normal Dentition                Assessment:   ASA SCORE: 1            Plan:   Anes. Type:  Spinal; MAC   Pre-Medication: None   Induction:  N/a   Airway: Native Airway   Access/Monitoring: PIV   Maintenance: N/a     Postop Plan:   Postop Pain: Regional  Postop Sedation/Airway: Not planned     PONV Management: Adult Risk Factors: Female   Prevention: Ondansetron     CONSENT: Direct conversation   Plan and risks discussed with: Patient   Blood Products: Consented (ALL Blood Products)                   Sherman Ocampo MD

## 2020-07-08 NOTE — ANESTHESIA PROCEDURE NOTES
Spinal/LP Procedure Note    Spinal Block  Staff -   Anesthesiologist:  Elie Horvath MD      Performed By: anesthesiologist        Location: In OR BEFORE Induction  Procedure Start/Stop Times:      7/8/2020 4:10 PM    patient identified, IV checked, site marked, risks and benefits discussed, informed consent, monitors and equipment checked, pre-op evaluation, at physician/surgeon's request and post-op pain management      Correct Patient: Yes      Correct Position: Yes      Correct Site: Yes      Correct Procedure: Yes      Correct Laterality:  Yes    Site Marked:  Yes  Procedure:     Procedure:  Intrathecal    ASA:  1    Diagnosis:  Labial hematoma    Position:  Right lateral decubitus    Sterile Prep: chloraprep      Insertion site:  L2-3    Approach:  Right paramedian    Needle Type:  Jorge    Needle gauge (G):  25    Local Skin Infiltration:  1% lidocaine    amount (ml):  2    Needle Length (in):  3.5    Introducer used: Yes      Introducer gauge:  20 G    Attempts:  1    Redirects:  0    CSF:  Clear    Paresthesias:  No    Time injected:  16:16

## 2020-07-08 NOTE — BRIEF OP NOTE
[facility], [place]    Brief Operative Note    Pre-operative diagnosis: Hematoma of labia majora [N90.89]  Post-operative diagnosis Same as pre-operative diagnosis    Procedure: Procedure(s):  Evacuation of Labial Hematoma  Surgeon: Surgeon(s) and Role:     * Samantha Martinez MD - Primary      * Racheal Carrillo MD - Assistant     * Yadira Reyes MD - Assistant    Anesthesia: Spinal   Estimated blood loss: 80 mL  Drains:  None  Specimens: * No specimens in log *  Findings:     Exam under anesthesia revealed a L labial hematoma measuring about 7x5 cm with significant surrounding bruising at the level of the vaginal introitus. A esquivel catheter was in place prior to her arrival today. The hematoma was fluctuant and had a ~5 mm area of thin clot through which a pinky finger was able to pass. 70 mL of dark, clotted blood was suctioned through the 5 mm hole. The internal cavity of the hematoma measured about 6x4x3 cm.    Complications: None.    Dr. Martinez was present and scrubbed for the entire procedure.    Yadira Reyes MD  Obstetrics and Gynecology, PGY-1  July 8, 2020, 5:11 PM       Samantha Martinez MD

## 2020-07-08 NOTE — DISCHARGE INSTRUCTIONS
Same-Day Surgery   Adult Discharge Orders & Instructions     For 24 hours after surgery:  1. Get plenty of rest.  A responsible adult must stay with you for at least 24 hours after you leave the hospital.   2. Pain medication can slow your reflexes. Do not drive or use heavy equipment.  If you have weakness or tingling, don't drive or use heavy equipment until this feeling goes away.  3. Mixing alcohol and pain medication can cause dizziness and slow your breathing. It can even be fatal. Do not drink alcohol while taking pain medication.  4. Avoid strenuous or risky activities.  Ask for help when climbing stairs.   5. You may feel lightheaded.  If so, sit for a few minutes before standing.  Have someone help you get up.   6. If you have nausea (feel sick to your stomach), drink only clear liquids such as apple juice, ginger ale, broth or 7-Up.  Rest may also help.  Be sure to drink enough fluids.  Move to a regular diet as you feel able. Take pain medications with a small amount of solid food, such as toast or crackers, to avoid nausea.   7. A slight fever is normal. Call the doctor if your fever is over 100 F (37.7 C) (taken under the tongue) or lasts longer than 24 hours.  8. You may have a dry mouth, muscle aches, trouble sleeping or a sore throat.  These symptoms should go away after 24 hours.  9. Do not make important or legal decisions.   Pain Management:      1. Take pain medication (if prescribed) for pain as directed by your physician.        2. WARNING: If the pain medication you have been prescribed contains Tylenol  (acetaminophen), DO NOT take additional doses of Tylenol (acetaminophen).     Call your doctor for any of the followin.  Signs of infection (fever, growing tenderness at the surgery site, severe pain, a large amount of drainage or bleeding, foul-smelling drainage, redness, swelling).    2.  It has been over 8 to 10 hours since surgery and you are still not able to urinate (pee).    3.   Headache for over 24 hours.    4.  Numbness, tingling or weakness the day after surgery (if you had spinal anesthesia).  To contact a doctor, call _____________________________________ or:      [phone] and ask for the Resident On Call for:          __________________________________________ (answered 24 hours a day)      Emergency Department:  [place]: [phone]  [facility]: [phone]               Rev. 10/2014

## 2020-07-09 ENCOUNTER — NURSE TRIAGE (OUTPATIENT)
Dept: NURSING | Facility: CLINIC | Age: 30
End: 2020-07-09

## 2020-07-09 DIAGNOSIS — S30.23XA: Primary | ICD-10-CM

## 2020-07-09 RX ORDER — OXYCODONE HYDROCHLORIDE 5 MG/1
5 TABLET ORAL EVERY 4 HOURS
Qty: 30 TABLET | Refills: 0 | Status: SHIPPED | OUTPATIENT
Start: 2020-07-09 | End: 2020-08-03

## 2020-07-09 NOTE — TELEPHONE ENCOUNTER
Having terrible pain and OTC pain relievers not working.    Has no oxycodones left. Miserable, crying, cannot sit or lay.    Wants to talk to surgeon or pcp for help getting something for pain.    Uses [facility] in [place].  Not allergic to any meds.    Flora Winslow RN  [facility] Nurse Advisor

## 2020-07-09 NOTE — TELEPHONE ENCOUNTER
Patient had surgery yesterday for evacuation of labial hematoma. Patient never got pain medication after her surgery yesterday. She was advised to take ibuprofen and tylenol intermittently. Pt had 2 percocet left from prescription she got on Friday 7/4 in [place]. Last night once everything wore off, pain came through like a freight train, took Percocet, 3 hours later had break through pain and took the other Percocet. No more pain medication. Pacing around in pain right now and in tears. Discussed going to the ER for evaluation of pain. Pt declined as cannot sit right now and last thing she wants to do is sit in the ER. Routing to on-call provider, surgeon BE and SPRING. Are you able to prescribe her something for her pain? Pharmacy is teed up. Thanks.   Minna Villagran, RN-BSN

## 2020-07-09 NOTE — TELEPHONE ENCOUNTER
Contacted BE. She is going to prescribe oxy 5mg 1-2 every 4 hours, 30 tabs, no refills. Also advised pt do sits baths.     TC to patient. Informed her a prescription was going to be called in. Advised alternating Tylenol and Ibuprofen and doing sits baths. Pt aware BE will call her in about an hour and half.   Minna Villagran, JUAN MANUEL-BSN

## 2020-07-09 NOTE — ANESTHESIA POSTPROCEDURE EVALUATION
Anesthesia POST Procedure Evaluation    Patient: Bettye Sagastume   MRN:     [id number] Gender:   female   Age:    30 year old :      1990        Preoperative Diagnosis: Hematoma of labia majora [N90.89]   Procedure(s):  Evacuation of Labial Hematoma   Postop Comments: No value filed.     Anesthesia Type: Spinal, MAC          Postop Pain Control: Uneventful            Sign Out: Well controlled pain   PONV: No   Neuro/Psych: Uneventful            Sign Out: Acceptable/Baseline neuro status   Airway/Respiratory: Uneventful            Sign Out: Acceptable/Baseline resp. status   CV/Hemodynamics: Uneventful            Sign Out: Acceptable CV status   Other NRE: NONE   DID A NON-ROUTINE EVENT OCCUR? No         Last Anesthesia Record Vitals:  CRNA VITALS  2020 1639 - 2020 1739      2020             NIBP:  104/60    Pulse:  64    NIBP Mean:  66    Temp:  36.3  C (97.3  F)    SpO2:  100 %    Resp Rate (observed):  14          Last PACU Vitals:  Vitals Value Taken Time   /70 2020  6:05 PM   Temp 36.3  C (97.3  F) 2020  6:05 PM   Pulse 55 2020  6:00 PM   Resp 16 2020  6:05 PM   SpO2 100 % 2020  6:05 PM   Temp src     NIBP     Pulse     SpO2     Resp     Temp     Ht Rate     Temp 2     Vitals shown include unvalidated device data.      Electronically Signed By: Sherman Ocampo MD, 2020, 7:36 PM

## 2020-07-15 ENCOUNTER — OFFICE VISIT (OUTPATIENT)
Dept: OBGYN | Facility: CLINIC | Age: 30
End: 2020-07-15
Payer: COMMERCIAL

## 2020-07-15 VITALS
TEMPERATURE: 98.1 F | WEIGHT: 134.19 LBS | DIASTOLIC BLOOD PRESSURE: 85 MMHG | SYSTOLIC BLOOD PRESSURE: 124 MMHG | BODY MASS INDEX: 25.35 KG/M2 | HEART RATE: 86 BPM

## 2020-07-15 DIAGNOSIS — N90.89 HEMATOMA OF LABIA MAJORA: Primary | ICD-10-CM

## 2020-07-15 PROCEDURE — 99024 POSTOP FOLLOW-UP VISIT: CPT | Performed by: ADVANCED PRACTICE MIDWIFE

## 2020-07-15 RX ORDER — ACETAMINOPHEN 500 MG
500-1000 TABLET ORAL EVERY 8 HOURS PRN
COMMUNITY
End: 2020-08-03

## 2020-07-15 NOTE — Clinical Note
She looks great, minimal pain, healing well, and of course she loves you!  Thank you once again for your great care and compassion.

## 2020-07-15 NOTE — PROGRESS NOTES
Bettye Sagastume is a 30 year old who presents to the clinic for post op follow up of left labial hematoma.   Needed narcotics for pain control through .   Woke  feeling much better and has continued to improve since.   Pain is now well controlled with tylenol and is planning on reducing her use of that as well.   Has had minimal serous drainage from the incision site and no odor noted.   Bladder has returned to normal function without pain and bowels are normal with no constipation, pain or bleeding.     Histories reviewed and updated  Past Medical History:   Diagnosis Date     NO ACTIVE PROBLEMS      Past Surgical History:   Procedure Laterality Date     AS REPAIR RECTO-VAG FISTULA       COSMETIC SURGERY      breast reduction     epesiotomy revision       GYN SURGERY       x1     GYN SURGERY      tubal ligation     IRRIGATION AND DEBRIDEMENT VAGINA N/A 2020    Procedure: Evacuation of Labial Hematoma;  Surgeon: Samantha Martinez MD;  Location:  OR     Social History     Socioeconomic History     Marital status: Significant other     Spouse name: Not on file     Number of children: Not on file     Years of education: Not on file     Highest education level: Not on file   Occupational History     Not on file   Social Needs     Financial resource strain: Not on file     Food insecurity     Worry: Not on file     Inability: Not on file     Transportation needs     Medical: Not on file     Non-medical: Not on file   Tobacco Use     Smoking status: Former Smoker     Packs/day: 0.50     Types: Cigarettes     Last attempt to quit: 2011     Years since quittin.4     Smokeless tobacco: Never Used   Substance and Sexual Activity     Alcohol use: Yes     Drug use: No     Sexual activity: Yes     Partners: Male     Birth control/protection: Female Surgical   Lifestyle     Physical activity     Days per week: Not on file     Minutes per session: Not on file     Stress: Not on file    Relationships     Social connections     Talks on phone: Not on file     Gets together: Not on file     Attends Islam service: Not on file     Active member of club or organization: Not on file     Attends meetings of clubs or organizations: Not on file     Relationship status: Not on file     Intimate partner violence     Fear of current or ex partner: Not on file     Emotionally abused: Not on file     Physically abused: Not on file     Forced sexual activity: Not on file   Other Topics Concern     Parent/sibling w/ CABG, MI or angioplasty before 65F 55M? Not Asked   Social History Narrative     Not on file     Family History   Problem Relation Age of Onset     Hypertension Father      Lipids Father      Cancer Maternal Grandmother         lymphoma     Heart Disease Paternal Grandfather         MI     Glaucoma No family hx of      Macular Degeneration No family hx of           ROS: 10 point ROS neg other than the symptoms noted above in the HPI.    EXAM:  /85 (BP Location: Right arm, Patient Position: Sitting, Cuff Size: Adult Regular)   Pulse 86   Temp 98.1  F (36.7  C) (Oral)   Wt 60.9 kg (134 lb 3 oz)   LMP 06/21/2020 (Approximate)   BMI 25.35 kg/m    GENERAL:  Pleasant female, no acute distress  EYES: sclera clear  RESP: breathing unlabored  CV: extremities without edema  M/S: no gross deformities  Neuro:  normal strength and sensation  : PELVIC EXAM:  Vulva: bruising is significantly reduced.  There is some on the edge of the right labia none on the left in the area of the hematoma.  The left buttock however has some diffuse bruising.  The incision is well approximated and healing without drainage currently.  Small amount of suture material remains as expected. There is a 3 x 3 area at the bottom half of the left labia that is firm to touch but not painful.    Psych:  alert, with normal speech and behavior        ASSESSMENT/PLAN:    (N90.89) Hematoma of labia majora  (primary encounter  diagnosis)  Comment:   Plan:     Continue sitz and kalie care as previous.   Call with change in status.   If sutures bother her consider return in a week for removal.           Visit length 20 minutes was spent face to face with the patient today discussing her history, diagnosis, and follow-up plan as noted above.  Over 50% of the visit was spent in counseling and coordination of care.    Time noted does not include time required to complete procedures.

## 2020-07-16 ENCOUNTER — TELEPHONE (OUTPATIENT)
Dept: OBGYN | Facility: CLINIC | Age: 30
End: 2020-07-16

## 2020-07-16 NOTE — TELEPHONE ENCOUNTER
Patient needs a work release note stating that patient can return to work on Wed 22nd with/without restrictions, whatever the provider recommends.     Needs faxed to [facility] [employer] [facility] fax [phone]. Please call to confirm sent.

## 2020-07-16 NOTE — LETTER
July 18, 2020      RE: Bettye Sagastume  [address] [facility] [address]       To whom it may concern:    Bettye Sagastume was seen in our clinic on 7/15/2020. She  may return to work without restriction on 7/22/2020  Sincerely,      Rebeka BOWLES, DM

## 2020-07-18 NOTE — TELEPHONE ENCOUNTER
I have written her letter.  Please send it out for her on Monday and call to let her know it is on the way.  Thank you  WILBUR Rodriguez, DM

## 2020-07-31 NOTE — PROGRESS NOTES
Subjective     Bettye Sagastume is a 30 year old female who presents to clinic today for the following health issues:    HPI       Vaginal Symptoms  Onset: 1 week ago    Description:  Vaginal Discharge: none   Itching (Pruritis): YES  Burning sensation:  no   Odor: no     Accompanying Signs & Symptoms:  Pain with Urination: no   Abdominal Pain: no   Fever: no     History:   Sexually active: YES  New Partner: YES  Possibility of Pregnancy:  No    Precipitating factors:   Recent Antibiotic Use: no     Alleviating factors:  none    Therapies Tried and outcome: Diflucan    Patient had a pelvic straddle injury about a month ago, surgery due to hematoma of the labia majora. Feels since surgery, she has been healing well. No issues, except for the last 7 days has been having intermittent vulvar itching. Denies concerns with discharge, odor, burning. No abnormal urinary symptoms. Requests screening for gonorrhea/chlamydia. No changes in soaps, products.  Symptoms have actually started to decrease in the last 2 days, but wanted testing to rule out infection.    Patient Active Problem List   Diagnosis     CARDIOVASCULAR SCREENING; LDL GOAL LESS THAN 160     ASCUS on Pap smear, HPV negative. Repeat Pap smear at postpartum exam     Hematoma of labia majora     Past Surgical History:   Procedure Laterality Date     AS REPAIR RECTO-VAG FISTULA       COSMETIC SURGERY      breast reduction     epesiotomy revision       GYN SURGERY       x1     GYN SURGERY      tubal ligation     IRRIGATION AND DEBRIDEMENT VAGINA N/A 2020    Procedure: Evacuation of Labial Hematoma;  Surgeon: Samantha Martinez MD;  Location:  OR       Social History     Tobacco Use     Smoking status: Former Smoker     Packs/day: 0.50     Types: Cigarettes     Last attempt to quit: 2011     Years since quittin.4     Smokeless tobacco: Never Used   Substance Use Topics     Alcohol use: Yes     Family History   Problem Relation Age of Onset  "    Hypertension Father      Lipids Father      Cancer Maternal Grandmother         lymphoma     Heart Disease Paternal Grandfather         MI     Glaucoma No family hx of      Macular Degeneration No family hx of            Reviewed and updated as needed this visit by Provider  Problems         Review of Systems   Constitutional, HEENT, cardiovascular, pulmonary, gi and gu systems are negative, except as otherwise noted.      Objective    /85 (BP Location: Right arm, Patient Position: Sitting, Cuff Size: Adult Regular)   Pulse 57   Temp 97.1  F (36.2  C) (Tympanic)   Ht 1.588 m (5' 2.5\")   Wt 60.4 kg (133 lb 3.2 oz)   LMP 07/20/2020 (Approximate)   SpO2 98%   BMI 23.97 kg/m    Body mass index is 23.97 kg/m .  Physical Exam   GENERAL: healthy, alert and no distress  ABDOMEN: soft, nontender, no hepatosplenomegaly, no masses and bowel sounds normal   (female): external genitalia-healing well, normal urethral meatus, vaginal mucosa pink, moist, well rugated, vaginal discharge - white and thin and normal cervix, adnexae, and uterus without masses.  MS: no gross musculoskeletal defects noted, no edema  SKIN: no suspicious lesions or rashes  PSYCH: mentation appears normal, affect normal/bright    Diagnostic Test Results:  Labs reviewed in Epic  Results for orders placed or performed in visit on 08/03/20 (from the past 24 hour(s))   Wet prep    Specimen: Vagina   Result Value Ref Range    Specimen Description Vagina     Wet Prep No yeast seen     Wet Prep Clue cells seen  Few   (A)     Wet Prep No Trichomonas seen     Wet Prep WBC'S seen  Few              Assessment & Plan     1. Vulvar pruritus  Will follow up with patient when results available.  - Wet prep  - Neisseria gonorrhoeae PCR  - Chlamydia trachomatis PCR    2. BV (bacterial vaginosis)  Patient requested voicemail on her cell # with results. We had discussed treatment if clue cells were present. She was instructed not to drink alcohol while " taking this medication and to take with food as it may cause GI upset. Prescription sent and she is to call if she has any questions.  - metroNIDAZOLE (FLAGYL) 500 MG tablet; Take 1 tablet (500 mg) by mouth 2 times daily for 7 days  Dispense: 14 tablet; Refill: 0     WILBUR Veras [facility]

## 2020-08-03 ENCOUNTER — OFFICE VISIT (OUTPATIENT)
Dept: OBGYN | Facility: CLINIC | Age: 30
End: 2020-08-03
Payer: COMMERCIAL

## 2020-08-03 VITALS
WEIGHT: 133.2 LBS | HEIGHT: 63 IN | OXYGEN SATURATION: 98 % | HEART RATE: 57 BPM | DIASTOLIC BLOOD PRESSURE: 85 MMHG | BODY MASS INDEX: 23.6 KG/M2 | TEMPERATURE: 97.1 F | SYSTOLIC BLOOD PRESSURE: 130 MMHG

## 2020-08-03 DIAGNOSIS — L29.2 VULVAR PRURITUS: Primary | ICD-10-CM

## 2020-08-03 DIAGNOSIS — B96.89 BV (BACTERIAL VAGINOSIS): ICD-10-CM

## 2020-08-03 DIAGNOSIS — N76.0 BV (BACTERIAL VAGINOSIS): ICD-10-CM

## 2020-08-03 LAB
SPECIMEN SOURCE: ABNORMAL
WET PREP SPEC: ABNORMAL

## 2020-08-03 PROCEDURE — 99213 OFFICE O/P EST LOW 20 MIN: CPT | Performed by: NURSE PRACTITIONER

## 2020-08-03 PROCEDURE — 87491 CHLMYD TRACH DNA AMP PROBE: CPT | Performed by: NURSE PRACTITIONER

## 2020-08-03 PROCEDURE — 87210 SMEAR WET MOUNT SALINE/INK: CPT | Performed by: NURSE PRACTITIONER

## 2020-08-03 PROCEDURE — 87591 N.GONORRHOEAE DNA AMP PROB: CPT | Performed by: NURSE PRACTITIONER

## 2020-08-03 RX ORDER — METRONIDAZOLE 500 MG/1
500 TABLET ORAL 2 TIMES DAILY
Qty: 14 TABLET | Refills: 0 | Status: SHIPPED | OUTPATIENT
Start: 2020-08-03 | End: 2020-08-10

## 2020-08-03 ASSESSMENT — PAIN SCALES - GENERAL: PAINLEVEL: NO PAIN (0)

## 2020-08-03 ASSESSMENT — MIFFLIN-ST. JEOR: SCORE: 1280.38

## 2020-08-04 LAB
C TRACH DNA SPEC QL NAA+PROBE: NEGATIVE
N GONORRHOEA DNA SPEC QL NAA+PROBE: NEGATIVE
SPECIMEN SOURCE: NORMAL
SPECIMEN SOURCE: NORMAL

## 2020-09-14 ENCOUNTER — NURSE TRIAGE (OUTPATIENT)
Dept: NURSING | Facility: CLINIC | Age: 30
End: 2020-09-14

## 2020-09-14 NOTE — TELEPHONE ENCOUNTER
Patient reports she might be dealing with late onset ADD/ADHD/depression. She isn't sure what it is. It's been going on for awhile and she has her up and downs.     She has a lot of stress in her life currently. 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     She struggles with forgetfulness, attention span. Not being able to finish a task    She doesn't have a PCP within [place]. She states she went from [facility] to [place] within the last 2 years. She only has a OB doctor     Warm transferred to [facility].     Myah Rich RN/PING [facility]      COVID 19 Nurse Triage Plan/Patient Instructions    Please be aware that novel coronavirus (COVID-19) may be circulating in the community. If you develop symptoms such as fever, cough, or SOB or if you have concerns about the presence of another infection including coronavirus (COVID-19), please contact your health care provider or visit [email or web address].     Disposition/Instructions    In-Person Visit with provider recommended. Reference Visit Selection Guide.    Thank you for taking steps to prevent the spread of this virus.  o Limit your contact with others.  o Wear a simple mask to cover your cough.  o Wash your hands well and often.    Resources    [facility] [place]: About COVID-19: [email or web address]    CDC: What to Do If You're Sick: www.cdc.gov/coronavirus/2019-ncov/about/steps-when-sick.html    CDC: Ending Home Isolation: www.cdc.gov/coronavirus/2019-ncov/hcp/disposition-in-home-patients.html     CDC: Caring for Someone: www.cdc.gov/coronavirus/2019-ncov/if-you-are-sick/care-for-someone.html     [facility]: Interim Guidance for Hospital Discharge to Home: www.health.[facility].[place].us/diseases/coronavirus/hcp/hospdischarge.pdf    [facility] clinical trials (COVID-19 research studies): clinicalaffairs.[facility]/umn-clinical-trials     Below are the COVID-19 hotlines at the [place] Department of Health  ([facility]). Interpreters are available.   o For health questions: Call 573-297-8195 or 1-606.607.1838 (7 a.m. to 7 p.m.)  o For questions about schools and childcare: Call 536-646-9465 or 1-738.797.9994 (7 a.m. to 7 p.m.)       Additional Information    Symptoms interfere with work or school    Negative: [1] Difficulty breathing AND [2] persists > 10 minutes AND [3] not relieved by reassurance provided by triager    Negative: [1] Lightheadedness or dizziness AND [2] persists > 10 minutes AND [3] not relieved by reassurance provided by triager    Negative: [1] Alcohol or drug abuse, known or suspected AND [2] feeling very shaky (i.e., visible tremors of hands)    Negative: Patient sounds very sick or weak to the triager    Negative: Severe difficulty breathing (e.g., struggling for each breath, speaks in single words)    Negative: Bluish (or gray) lips or face now    Negative: Difficult to awaken or acting confused (e.g., disoriented, slurred speech)    Negative: Hysterical or combative behavior    Negative: Sounds like a life-threatening emergency to the triager    Negative: Chest pain    Negative: Palpitations, skipped heart beat, or rapid heart beat    Negative: Cough is main symptom    Negative: Suicide thoughts, threats, attempts, or questions    Negative: Depression is main problem or symptom (e.g., feelings of sadness or hopelessness)    Protocols used: ANXIETY AND PANIC ATTACK-A-

## 2020-09-15 NOTE — PROGRESS NOTES
Subjective     Bettye Sagastume is a 30 year old female who presents to clinic today for the following health issues:    History of Present Illness        Mental Health Follow-up:  Patient presents to follow-up on Anxiety.    Patient's anxiety since last visit has been:  Worse  The patient is having other symptoms associated with anxiety.  Any significant life events: other  Patient is feeling anxious or having panic attacks.  Patient has concerns about alcohol or drug use.     Social History  Tobacco Use    Smoking status: Former Smoker      Packs/day: 0.50      Types: Cigarettes      Quit date: 2011      Years since quittin.5    Smokeless tobacco: Never Used  Alcohol use: Yes  Drug use: No      Today's PHQ-9         PHQ-9 Total Score:     (P) 19   PHQ-9 Q9 Thoughts of better off dead/self-harm past 2 weeks :   (P) Not at all   Thoughts of suicide or self harm:      Self-harm Plan:        Self-harm Action:          Safety concerns for self or others:           She eats 2-3 servings of fruits and vegetables daily.She consumes 0 sweetened beverage(s) daily.She exercises with enough effort to increase her heart rate 30 to 60 minutes per day.  She exercises with enough effort to increase her heart rate 4 days per week.   She is taking medications regularly.         Abnormal Mood Symptoms  Onset/Duration: Years   Description: Mood swings, irritability, unable to complete task's, cannot focus.  Depression (if yes, do PHQ-9): YES  Anxiety (if yes, do NEGAR-7): YES  Accompanying Signs & Symptoms:  Still participating in activities that you used to enjoy: YES  Fatigue: YES  Irritability: YES  Difficulty concentrating: YES  Changes in appetite: no  Problems with sleep: YES  Heart racing/beating fast: YES  Abnormally elevated, expansive, or irritable mood: YES  Persistently increased activity or energy: YES- occasional  Thoughts of hurting yourself or others: no  History:  Recent stress or major life event: YES- going  "back to school herself and child  Prior depression or anxiety: avoided being screened  Family history of depression or anxiety: YES- mom  Alcohol/drug use: YES- 1-2 times a week occasionally binges and black's out  Difficulty sleeping: YES  Precipitating or alleviating factors: drinking makes it worse but it is always  Therapies tried and outcome:  Diet, exercise     - Has had symptoms of anxiety and depression likely for years.  Had not addressed it.  She admits there are things from her past she needs to address.  She tried to find a counselor but no one has called her back.  Her mother has a history of depression and was treated for Prozac for probably 15 years.   - Bettye is interested in medication and counseling.   - She isn't sure if she has ADD or it is just her anxiety. She can't focus or concentrate.   - Sometimes sleeps too much or too little.  If having issues falling asleep she can take Melatonin but she sometimes will wake up frequently.   - No drug use.  She does occasionally drink alcohol, doesn't think she self medicates at all.    - No thoughts of suicide or self harm.   - She has never been to counseling or on medications in the past for these concerns.   - No personal or family history of thyroid disorders.     PHQ 12/18/2019 9/16/2020   PHQ-9 Total Score 7 19   Q9: Thoughts of better off dead/self-harm past 2 weeks Not at all Not at all     NEGAR-7 SCORE 9/16/2020   Total Score 17 (severe anxiety)   Total Score 17       Review of Systems   Constitutional, HEENT, cardiovascular, pulmonary, GI, , musculoskeletal, neuro, skin, endocrine and psych systems are negative, except as otherwise noted.      Objective    /80   Pulse 62   Temp 97.6  F (36.4  C)   Resp 14   Ht 1.549 m (5' 1\")   Wt 61.1 kg (134 lb 12.8 oz)   LMP 09/13/2020 (Approximate)   Breastfeeding No   BMI 25.47 kg/m    Body mass index is 25.47 kg/m .  Physical Exam   GENERAL: healthy, alert and no distress  NECK: no " adenopathy, no asymmetry, masses, or scars and thyroid normal to palpation  RESP: lungs clear to auscultation - no rales, rhonchi or wheezes  CV: regular rate and rhythm, normal S1 S2, no S3 or S4, no murmur, click or rub, no peripheral edema and peripheral pulses strong  MS: no gross musculoskeletal defects noted, no edema  PSYCH: mentation appears normal, affect normal/bright, anxious, speech pressured, judgement and insight intact and appearance well groomed    No results found for this or any previous visit (from the past 24 hour(s)).        Assessment & Plan     Bettye was seen today for mood changes.    Diagnoses and all orders for this visit:    Moderate episode of recurrent major depressive disorder (H)  -     MENTAL HEALTH REFERRAL  - Adult; Outpatient Treatment; Individual/Couples/Family/Group Therapy/Health Psychology; [facility]: [facility] [phone]; We will contact you to schedule the appointment or please call with any questions  -     TSH with free T4 reflex  -     Vitamin D Deficiency  -     FLUoxetine (PROZAC) 10 MG capsule; Take 1 capsule (10 mg) by mouth daily    Generalized anxiety disorder  -     MENTAL HEALTH REFERRAL  - Adult; Outpatient Treatment; Individual/Couples/Family/Group Therapy/Health Psychology; [facility]: [facility] [phone]; We will contact you to schedule the appointment or please call with any questions  -     TSH with free T4 reflex  -     Vitamin D Deficiency  -     FLUoxetine (PROZAC) 10 MG capsule; Take 1 capsule (10 mg) by mouth daily      Depression Screening Follow Up    PHQ 9/16/2020   PHQ-9 Total Score 19   Q9: Thoughts of better off dead/self-harm past 2 weeks Not at all       Follow Up Actions Taken  Mental Health Referral placed  Follow up recommended: 2-4 Weeks for medication re-check.      - Elected to start Fluoxetine as mother has had good results on this medication.   - Started at 1 tablet daily x 7 days and then increase to 2  tablets if tolerating.     Discussed with patient the risks and benefits of anti-depressant/anti-anxiety medications. We discussed the common side effects with the medication that most resolve within 2 weeks of starting the medication.  We discussed the black box warning of increased risk of suicidal ideation.  Patient today is not experiencing suicidal ideation.  Discussed importance of taking medication once daily and not missing doses.  Also how we slowly titrate medication to limit side effects to the most effective dose and if they are ever going to stop the medication they should taper off the medication.  Reminded patient these medications can take 4-6 weeks to see their maximum potential.  Recommend titration to highest dose tolerated before switching medication types.  We also discussed GeneSight testing, when it would be helpful, cost, etc.     Encouraged counseling in addition to medication management. Referral placed.      Options for treatment and follow-up care were reviewed with the patient and/or guardian. Patient and/or guardian engaged in the decision making process and verbalized understanding of the options discussed and agreed with the final plan.     Return in about 2 weeks (around 9/30/2020) for Medication Re-check.    Asha Carpenter PA-C  [facility]    Answers for HPI/ROS submitted by the patient on 9/16/2020   Chronic problems general questions HPI Form  If you checked off any problems, how difficult have these problems made it for you to do your work, take care of things at home, or get along with other people?: Extremely difficult  PHQ9 TOTAL SCORE: 19  NEGAR 7 TOTAL SCORE: 17

## 2020-09-16 ENCOUNTER — OFFICE VISIT (OUTPATIENT)
Dept: FAMILY MEDICINE | Facility: OTHER | Age: 30
End: 2020-09-16
Payer: COMMERCIAL

## 2020-09-16 VITALS
HEIGHT: 61 IN | TEMPERATURE: 97.6 F | HEART RATE: 62 BPM | SYSTOLIC BLOOD PRESSURE: 136 MMHG | DIASTOLIC BLOOD PRESSURE: 80 MMHG | WEIGHT: 134.8 LBS | BODY MASS INDEX: 25.45 KG/M2 | RESPIRATION RATE: 14 BRPM

## 2020-09-16 DIAGNOSIS — F33.1 MODERATE EPISODE OF RECURRENT MAJOR DEPRESSIVE DISORDER (H): Primary | ICD-10-CM

## 2020-09-16 DIAGNOSIS — F41.1 GENERALIZED ANXIETY DISORDER: ICD-10-CM

## 2020-09-16 LAB
DEPRECATED CALCIDIOL+CALCIFEROL SERPL-MC: 23 UG/L (ref 20–75)
TSH SERPL DL<=0.005 MIU/L-ACNC: 1.94 MU/L (ref 0.4–4)

## 2020-09-16 PROCEDURE — 99203 OFFICE O/P NEW LOW 30 MIN: CPT | Performed by: PHYSICIAN ASSISTANT

## 2020-09-16 PROCEDURE — 82306 VITAMIN D 25 HYDROXY: CPT | Performed by: PHYSICIAN ASSISTANT

## 2020-09-16 PROCEDURE — 84443 ASSAY THYROID STIM HORMONE: CPT | Performed by: PHYSICIAN ASSISTANT

## 2020-09-16 PROCEDURE — 36415 COLL VENOUS BLD VENIPUNCTURE: CPT | Performed by: PHYSICIAN ASSISTANT

## 2020-09-16 RX ORDER — FLUOXETINE 10 MG/1
10 CAPSULE ORAL DAILY
Qty: 60 CAPSULE | Refills: 1 | Status: SHIPPED | OUTPATIENT
Start: 2020-09-16 | End: 2020-10-02

## 2020-09-16 ASSESSMENT — PATIENT HEALTH QUESTIONNAIRE - PHQ9
SUM OF ALL RESPONSES TO PHQ QUESTIONS 1-9: 19
SUM OF ALL RESPONSES TO PHQ QUESTIONS 1-9: 19
10. IF YOU CHECKED OFF ANY PROBLEMS, HOW DIFFICULT HAVE THESE PROBLEMS MADE IT FOR YOU TO DO YOUR WORK, TAKE CARE OF THINGS AT HOME, OR GET ALONG WITH OTHER PEOPLE: EXTREMELY DIFFICULT

## 2020-09-16 ASSESSMENT — ANXIETY QUESTIONNAIRES
GAD7 TOTAL SCORE: 17
GAD7 TOTAL SCORE: 17
4. TROUBLE RELAXING: MORE THAN HALF THE DAYS
2. NOT BEING ABLE TO STOP OR CONTROL WORRYING: NEARLY EVERY DAY
7. FEELING AFRAID AS IF SOMETHING AWFUL MIGHT HAPPEN: MORE THAN HALF THE DAYS
7. FEELING AFRAID AS IF SOMETHING AWFUL MIGHT HAPPEN: MORE THAN HALF THE DAYS
3. WORRYING TOO MUCH ABOUT DIFFERENT THINGS: NEARLY EVERY DAY
1. FEELING NERVOUS, ANXIOUS, OR ON EDGE: NEARLY EVERY DAY
5. BEING SO RESTLESS THAT IT IS HARD TO SIT STILL: SEVERAL DAYS
GAD7 TOTAL SCORE: 17
6. BECOMING EASILY ANNOYED OR IRRITABLE: NEARLY EVERY DAY

## 2020-09-16 ASSESSMENT — MIFFLIN-ST. JEOR: SCORE: 1263.83

## 2020-09-16 ASSESSMENT — PAIN SCALES - GENERAL: PAINLEVEL: NO PAIN (0)

## 2020-09-16 NOTE — PATIENT INSTRUCTIONS
Fluoxetine/Prozac:  Take 1 tablet once daily (ok to at bedtime) After a week if tolerating increase to 2 tablets at bedtime.     They will call you to schedule for counseling.   We will let you know the lab results.     [facility]  Let me know if any questions or concerns.

## 2020-09-17 ASSESSMENT — ANXIETY QUESTIONNAIRES: GAD7 TOTAL SCORE: 17

## 2020-09-17 ASSESSMENT — PATIENT HEALTH QUESTIONNAIRE - PHQ9: SUM OF ALL RESPONSES TO PHQ QUESTIONS 1-9: 19

## 2020-09-30 ENCOUNTER — TELEPHONE (OUTPATIENT)
Dept: FAMILY MEDICINE | Facility: OTHER | Age: 30
End: 2020-09-30

## 2020-09-30 NOTE — PROGRESS NOTES
Subjective     Bettye Sagastume is a 30 year old female who presents to clinic today for the following health issues:    History of Present Illness       Mental Health Follow-up:  Patient presents to follow-up on Anxiety.    Patient's anxiety since last visit has been:  Better  The patient is not having other symptoms associated with anxiety.  Any significant life events: other  Patient is not feeling anxious or having panic attacks.  Patient has no concerns about alcohol or drug use.     Social History  Tobacco Use    Smoking status: Former Smoker      Packs/day: 0.50      Types: Cigarettes      Quit date: 2011      Years since quittin.6    Smokeless tobacco: Never Used  Alcohol use: Yes    Comment: see note   Drug use: No      Today's PHQ-9         PHQ-9 Total Score:     (P) 6   PHQ-9 Q9 Thoughts of better off dead/self-harm past 2 weeks :   (P) Not at all   Thoughts of suicide or self harm:      Self-harm Plan:        Self-harm Action:          Safety concerns for self or others:           She eats 2-3 servings of fruits and vegetables daily.She consumes 0 sweetened beverage(s) daily.She exercises with enough effort to increase her heart rate 20 to 29 minutes per day.  She exercises with enough effort to increase her heart rate 4 days per week.   She is taking medications regularly.     - Since starting Fluoxetine she has noticed that she hasn't had super high anxiety, she feels more even.  She is now even sleeping well, staying asleep, she is so happy with this.   - currently taking 20 mg of Fluoxetine daily in the AM.   - She has noticed some times she'll feel foggy, but this isn't every day, she thinks it just depends on the day.    - Otherwise no other side effects noticed by her.    - She is not set up with counseling yet but has the number to call them to schedule.     Review of Systems   Constitutional, cardiovascular, pulmonary, GI, , neuro, and psych systems are negative, except as otherwise  "noted.      Objective    /64   Pulse 60   Temp 97.3  F (36.3  C) (Temporal)   Resp 16   Ht 1.549 m (5' 1\")   Wt 60.1 kg (132 lb 6.4 oz)   LMP 09/13/2020 (Approximate)   BMI 25.02 kg/m    Body mass index is 25.02 kg/m .  Physical Exam   GENERAL: healthy, alert and no distress  MS: no gross musculoskeletal defects noted, no edema  SKIN: no suspicious lesions or rashes  PSYCH: mentation appears normal, affect normal/bright    No results found for this or any previous visit (from the past 24 hour(s)).        Assessment & Plan     Bettye was seen today for recheck medication.    Diagnoses and all orders for this visit:    Moderate episode of recurrent major depressive disorder (H)  -     FLUoxetine (PROZAC) 10 MG capsule; Take 3 capsules (30 mg) by mouth daily    Generalized anxiety disorder  -     FLUoxetine (PROZAC) 10 MG capsule; Take 3 capsules (30 mg) by mouth daily        Uncertain if the foggy feeling is from medication but she notes this isn't a consistent symptom therefore less likely.  May want to try to switch to taking medication in PM to see if this helps.  She would like to increase dose.  We will increase to 30 mg daily.  Discussed potential side effects to monitor for.  Encouraged to schedule with counseling as she notes there are lot of things from her past she needs to work through, she has the number to schedule.  Otherwise she has had good improvement in symptoms since starting medication, BP is lower, she is now sleeping well, no acute anxiety attacks. Will re-check with her how she is doing with increase in dose in the next 2-4 weeks and if doing well and stable follow-up in 6 months.     Return in about 3 weeks (around 10/23/2020) for Medication Re-check.     Options for treatment and follow-up care were reviewed with the patient and/or guardian. Patient and/or guardian engaged in the decision making process and verbalized understanding of the options discussed and agreed with the " final plan.     Asha Carpenter PA-C  [facility]    Answers for HPI/ROS submitted by the patient on 10/2/2020   Chronic problems general questions HPI Form  If you checked off any problems, how difficult have these problems made it for you to do your work, take care of things at home, or get along with other people?: Not difficult at all  PHQ9 TOTAL SCORE: 6  NEGAR 7 TOTAL SCORE: 5

## 2020-10-02 ENCOUNTER — OFFICE VISIT (OUTPATIENT)
Dept: FAMILY MEDICINE | Facility: OTHER | Age: 30
End: 2020-10-02
Payer: COMMERCIAL

## 2020-10-02 ENCOUNTER — TELEPHONE (OUTPATIENT)
Dept: FAMILY MEDICINE | Facility: OTHER | Age: 30
End: 2020-10-02

## 2020-10-02 VITALS
HEART RATE: 60 BPM | DIASTOLIC BLOOD PRESSURE: 64 MMHG | SYSTOLIC BLOOD PRESSURE: 114 MMHG | TEMPERATURE: 97.3 F | BODY MASS INDEX: 25 KG/M2 | WEIGHT: 132.4 LBS | RESPIRATION RATE: 16 BRPM | HEIGHT: 61 IN

## 2020-10-02 DIAGNOSIS — F41.1 GENERALIZED ANXIETY DISORDER: ICD-10-CM

## 2020-10-02 DIAGNOSIS — F33.1 MODERATE EPISODE OF RECURRENT MAJOR DEPRESSIVE DISORDER (H): ICD-10-CM

## 2020-10-02 PROCEDURE — 99213 OFFICE O/P EST LOW 20 MIN: CPT | Performed by: PHYSICIAN ASSISTANT

## 2020-10-02 RX ORDER — FLUOXETINE 10 MG/1
30 CAPSULE ORAL DAILY
Qty: 90 CAPSULE | Refills: 1 | Status: SHIPPED | OUTPATIENT
Start: 2020-10-02 | End: 2020-12-08

## 2020-10-02 ASSESSMENT — PATIENT HEALTH QUESTIONNAIRE - PHQ9
SUM OF ALL RESPONSES TO PHQ QUESTIONS 1-9: 6
SUM OF ALL RESPONSES TO PHQ QUESTIONS 1-9: 6
10. IF YOU CHECKED OFF ANY PROBLEMS, HOW DIFFICULT HAVE THESE PROBLEMS MADE IT FOR YOU TO DO YOUR WORK, TAKE CARE OF THINGS AT HOME, OR GET ALONG WITH OTHER PEOPLE: NOT DIFFICULT AT ALL

## 2020-10-02 ASSESSMENT — MIFFLIN-ST. JEOR: SCORE: 1252.94

## 2020-10-02 ASSESSMENT — PAIN SCALES - GENERAL: PAINLEVEL: NO PAIN (0)

## 2020-10-02 ASSESSMENT — ANXIETY QUESTIONNAIRES
1. FEELING NERVOUS, ANXIOUS, OR ON EDGE: SEVERAL DAYS
7. FEELING AFRAID AS IF SOMETHING AWFUL MIGHT HAPPEN: SEVERAL DAYS
5. BEING SO RESTLESS THAT IT IS HARD TO SIT STILL: NOT AT ALL
6. BECOMING EASILY ANNOYED OR IRRITABLE: SEVERAL DAYS
3. WORRYING TOO MUCH ABOUT DIFFERENT THINGS: SEVERAL DAYS
GAD7 TOTAL SCORE: 5
2. NOT BEING ABLE TO STOP OR CONTROL WORRYING: SEVERAL DAYS
GAD7 TOTAL SCORE: 5
GAD7 TOTAL SCORE: 5
4. TROUBLE RELAXING: NOT AT ALL
7. FEELING AFRAID AS IF SOMETHING AWFUL MIGHT HAPPEN: SEVERAL DAYS

## 2020-10-02 NOTE — TELEPHONE ENCOUNTER
Prior Authorization Retail Medication Request    Medication/Dose: FLUoxetine (PROZAC) 10 MG capsule  ICD code (if different than what is on RX):    Previously Tried and Failed:    Rationale:     Insurance Name:    Insurance ID:        Pharmacy Information (if different than what is on RX)  Name:    Phone:

## 2020-10-02 NOTE — TELEPHONE ENCOUNTER
Central Prior Authorization Team   Phone: [phone]      PA Initiation    Medication: FLUoxetine (PROZAC) 10 MG capsule  Insurance Company: [employer] - Phone [phone] Fax [phone]  Pharmacy Filling the Rx: [id number] - [place] - [address]  Filling Pharmacy Phone: [phone]  Filling Pharmacy Fax:    Start Date: 10/2/2020

## 2020-10-03 ASSESSMENT — PATIENT HEALTH QUESTIONNAIRE - PHQ9: SUM OF ALL RESPONSES TO PHQ QUESTIONS 1-9: 6

## 2020-10-03 ASSESSMENT — ANXIETY QUESTIONNAIRES: GAD7 TOTAL SCORE: 5

## 2020-10-05 NOTE — TELEPHONE ENCOUNTER
Prior Authorization Approval    Authorization Effective Date: 10/2/2020  Authorization Expiration Date: 10/2/2021  Medication: FLUoxetine (PROZAC) 10 MG capsule- APPROVED  Approved Dose/Quantity:   Reference #:     Insurance Company: "[employer] - Phone [phone] Fax [phone]  Expected CoPay:       CoPay Card Available:      Foundation Assistance Needed:    Which Pharmacy is filling the prescription (Not needed for infusion/clinic administered): [id number] - [place] - [address]  Pharmacy Notified: Yes-via fax, Called multiple times but kept getting a busy tone  Patient Notified: No

## 2020-10-23 ENCOUNTER — TELEPHONE (OUTPATIENT)
Dept: FAMILY MEDICINE | Facility: OTHER | Age: 30
End: 2020-10-23

## 2020-10-23 NOTE — TELEPHONE ENCOUNTER
LM for patient to return call to clinic. Please transfer to triage RN.       RN: see provide notes below.     Pari Abrams RN BSN

## 2020-10-23 NOTE — TELEPHONE ENCOUNTER
Please call to see if patient's symptoms have improved since being on the FLuoxetine longer and if it is working well.     Asha Carpenter PA-C

## 2020-10-26 NOTE — TELEPHONE ENCOUNTER
LM for the patient to return call to the clinic.   Please transfer to any triage RN.   Responded to [employer] message.   Fabian Reilly RN  October 21, 2020

## 2020-10-27 NOTE — TELEPHONE ENCOUNTER
"Spoke to patient, patient is currently on a 30mg dose glen. She takes this in the evening around 4-5pm. Patient states she feels better and able to deal with things better. The foggy feeling still comes and goes. Its not every day. Patient states \"I am sleeping better than she ever has\". Denies any thoughts of self harm or harming others. Anxiety has decreased significantly per patient. Routing to provider to review    Aicha Valenzuela RN    "

## 2020-12-08 ENCOUNTER — NURSE TRIAGE (OUTPATIENT)
Dept: NURSING | Facility: CLINIC | Age: 30
End: 2020-12-08

## 2020-12-08 ENCOUNTER — MYC REFILL (OUTPATIENT)
Dept: FAMILY MEDICINE | Facility: OTHER | Age: 30
End: 2020-12-08

## 2020-12-08 DIAGNOSIS — F33.1 MODERATE EPISODE OF RECURRENT MAJOR DEPRESSIVE DISORDER (H): ICD-10-CM

## 2020-12-08 DIAGNOSIS — F41.1 GENERALIZED ANXIETY DISORDER: ICD-10-CM

## 2020-12-09 RX ORDER — FLUOXETINE 10 MG/1
30 CAPSULE ORAL DAILY
Qty: 90 CAPSULE | Refills: 1 | OUTPATIENT
Start: 2020-12-09

## 2020-12-09 RX ORDER — FLUOXETINE 10 MG/1
30 CAPSULE ORAL DAILY
Qty: 90 CAPSULE | Refills: 1 | Status: SHIPPED | OUTPATIENT
Start: 2020-12-09 | End: 2021-03-05

## 2020-12-13 ENCOUNTER — HEALTH MAINTENANCE LETTER (OUTPATIENT)
Age: 30
End: 2020-12-13

## 2021-02-21 ENCOUNTER — HEALTH MAINTENANCE LETTER (OUTPATIENT)
Age: 31
End: 2021-02-21

## 2021-06-18 NOTE — PROGRESS NOTES
SUBJECTIVE:   CC: Bettye Sagastume is an 31 year old woman who presents for preventive health visit.     Patient has been advised of split billing requirements and indicates understanding: Yes  Healthy Habits:     Getting at least 3 servings of Calcium per day:  Yes    Bi-annual eye exam:  Yes    Dental care twice a year:  NO    Sleep apnea or symptoms of sleep apnea:  Daytime drowsiness    Diet:  Regular (no restrictions)    Frequency of exercise:  1 day/week    Duration of exercise:  45-60 minutes    Taking medications regularly:  No    Barriers to taking medications:  Problems remembering to take them    Medication side effects:  None    PHQ-2 Total Score: 2    Additional concerns today:  No       Pt needs physical to begin nursing program at [facility]    Today's PHQ-2 Score:   PHQ-2 ( 1999 [employer]) 6/21/2021   Q1: Little interest or pleasure in doing things 1   Q2: Feeling down, depressed or hopeless 1   PHQ-2 Score 2   Q1: Little interest or pleasure in doing things Several days   Q2: Feeling down, depressed or hopeless Several days   PHQ-2 Score 2       Abuse: Current or Past (Physical, Sexual or Emotional) - No  Do you feel safe in your environment? Yes    Have you ever done Advance Care Planning? (For example, a Health Directive, POLST, or a discussion with a medical provider or your loved ones about your wishes): No, advance care planning information given to patient to review.  Advanced care planning was discussed at today's visit.    Social History     Tobacco Use     Smoking status: Former Smoker     Packs/day: 0.50     Types: Cigarettes     Quit date: 2/17/2011     Years since quitting: 10.3     Smokeless tobacco: Never Used   Substance Use Topics     Alcohol use: Yes     Comment: see note        Alcohol Use 6/21/2021   Prescreen: >3 drinks/day or >7 drinks/week? Yes   AUDIT SCORE  5       Reviewed orders with patient.  Reviewed health maintenance and updated orders accordingly - Yes  BP Readings  from Last 3 Encounters:   21 104/70   10/02/20 114/64   20 136/80    Wt Readings from Last 3 Encounters:   21 64 kg (141 lb)   10/02/20 60.1 kg (132 lb 6.4 oz)   20 61.1 kg (134 lb 12.8 oz)                  Patient Active Problem List   Diagnosis     CARDIOVASCULAR SCREENING; LDL GOAL LESS THAN 160     ASCUS on Pap smear, HPV negative. Repeat Pap smear at postpartum exam     Hematoma of labia majora     Moderate episode of recurrent major depressive disorder (H)     Generalized anxiety disorder     Digestive-genital tract fistula, female     GBS (group B Streptococcus carrier), +RV culture, currently pregnant     Screening for malignant neoplasm of cervix     Past Surgical History:   Procedure Laterality Date     AS REPAIR RECTO-VAG FISTULA       COSMETIC SURGERY      breast reduction     epesiotomy revision       GYN SURGERY       x1     GYN SURGERY      tubal ligation     IRRIGATION AND DEBRIDEMENT VAGINA N/A 2020    Procedure: Evacuation of Labial Hematoma;  Surgeon: Samantha Martinez MD;  Location:  OR       Social History     Tobacco Use     Smoking status: Former Smoker     Packs/day: 0.50     Types: Cigarettes     Quit date: 2011     Years since quitting: 10.3     Smokeless tobacco: Never Used   Substance Use Topics     Alcohol use: Yes     Comment: see note      Family History   Problem Relation Age of Onset     Hypertension Father      Lipids Father      Cancer Maternal Grandmother         lymphoma     Heart Disease Paternal Grandfather         MI     Glaucoma No family hx of      Macular Degeneration No family hx of          Current Outpatient Medications   Medication Sig Dispense Refill     FLUoxetine (PROZAC) 10 MG capsule TAKE THREE CAPSULES BY MOUTH EVERY DAY 90 capsule 0     Allergies   Allergen Reactions     Nkda [No Known Drug Allergies]        Breast Cancer Screening:  Any new diagnosis of family breast, ovarian, or bowel cancer? No    FSH-7:    Breast CA Risk Assessment (FHS-7) 2021   Did any of your first-degree relatives have breast or ovarian cancer? No   Did any of your relatives have bilateral breast cancer? No   Did any man in your family have breast cancer? No   Did any woman in your family have breast and ovarian cancer? No   Did any woman in your family have breast cancer before age 50 y? Unknown   Do you have 2 or more relatives with breast and/or ovarian cancer? Unknown   Do you have 2 or more relatives with breast and/or bowel cancer? Unknown       Patient under 40 years of age: Routine Mammogram Screening not recommended.   Pertinent mammograms are reviewed under the imaging tab.    History of abnormal Pap smear: NO - age 30-65 PAP every 5 years with negative HPV co-testing recommended  PAP / HPV Latest Ref Rng & Units 2019   PAP - NIL ASC-US(A)   HPV 16 DNA NEG:Negative Negative -   HPV 18 DNA NEG:Negative Negative -   OTHER HR HPV NEG:Negative Negative -     Reviewed and updated as needed this visit by clinical staff  Tobacco  Allergies  Meds   Med Hx  Surg Hx  Fam Hx  Soc Hx        Reviewed and updated as needed this visit by Provider                  Past Medical History:   Diagnosis Date     NO ACTIVE PROBLEMS       Past Surgical History:   Procedure Laterality Date     AS REPAIR RECTO-VAG FISTULA       COSMETIC SURGERY      breast reduction     epesiotomy revision       GYN SURGERY       x1     GYN SURGERY      tubal ligation     IRRIGATION AND DEBRIDEMENT VAGINA N/A 2020    Procedure: Evacuation of Labial Hematoma;  Surgeon: Samantha Martinez MD;  Location: [facility]       Review of Systems   Constitutional: Negative for chills and fever.   HENT: Negative for congestion, ear pain, hearing loss and sore throat.    Eyes: Negative for pain and visual disturbance.   Respiratory: Negative for cough and shortness of breath.    Cardiovascular: Negative for chest pain, palpitations and peripheral edema.  "  Gastrointestinal: Negative for abdominal pain, constipation, diarrhea, heartburn, hematochezia and nausea.   Breasts:  Negative for tenderness, breast mass and discharge.   Genitourinary: Negative for dysuria, frequency, genital sores, hematuria, pelvic pain, urgency, vaginal bleeding and vaginal discharge.   Musculoskeletal: Negative for arthralgias, joint swelling and myalgias.   Skin: Negative for rash.   Neurological: Negative for dizziness, weakness, headaches and paresthesias.   Psychiatric/Behavioral: Negative for mood changes. The patient is not nervous/anxious.           OBJECTIVE:   /70   Pulse 60   Temp 97.1  F (36.2  C)   Resp 14   Ht 1.567 m (5' 1.69\")   Wt 64 kg (141 lb)   LMP 05/24/2021 (Approximate)   SpO2 98%   BMI 26.05 kg/m    Physical Exam  GENERAL: healthy, alert and no distress  EYES: Eyes grossly normal to inspection, PERRL and conjunctivae and sclerae normal  HENT: ear canals and TM's normal, nose and mouth without ulcers or lesions  NECK: no adenopathy, no asymmetry, masses, or scars and thyroid normal to palpation  RESP: lungs clear to auscultation - no rales, rhonchi or wheezes  BREAST: normal without masses, tenderness or nipple discharge and no palpable axillary masses or adenopathy  CV: regular rate and rhythm, normal S1 S2, no S3 or S4, no murmur, click or rub, no peripheral edema and peripheral pulses strong  ABDOMEN: soft, nontender, no hepatosplenomegaly, no masses and bowel sounds normal  MS: no gross musculoskeletal defects noted, no edema  SKIN: no suspicious lesions or rashes  NEURO: Normal strength and tone, mentation intact and speech normal  PSYCH: mentation appears normal, affect normal/bright    Diagnostic Test Results:  Labs reviewed in Epic    ASSESSMENT/PLAN:     Problem List Items Addressed This Visit     Moderate episode of recurrent major depressive disorder (H)     Symptoms well controlled on the current dose of Prozac.  She has not been taking it " "consistently though.  Encouraged regular use.  Refills provided.  Follow-up in 6 months.         Relevant Medications    FLUoxetine (PROZAC) 10 MG capsule    Generalized anxiety disorder    Relevant Medications    FLUoxetine (PROZAC) 10 MG capsule    Fatigue, unspecified type     She has been reporting increased fatigue over the past year and has a history of anemia in the past.  Would like to have labs completed to follow-up.         Relevant Orders    CBC with platelets (Completed)    Comprehensive metabolic panel (BMP + Alb, Alk Phos, ALT, AST, Total. Bili, TP) (Completed)    Healthcare maintenance     Reviewed immunization records.  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  Will follow results and complete forms if needed.           Other Visit Diagnoses     Screening for lipoid disorders    -  Primary    Relevant Orders    Lipid panel reflex to direct LDL Fasting (Completed)    Routine general medical examination at a health care facility        Need for hepatitis C screening test        Relevant Orders    **Hepatitis C Screen Reflex to RNA FUTURE anytime (Completed)    Screening for diabetes mellitus        Screening examination for pulmonary tuberculosis        Relevant Orders    Quantiferon-TB Gold Plus (Completed)    Need for vaccination        Relevant Orders    MMR VIRUS IMMUNIZATION [[id number]] (Completed)    Varicella Zoster Virus Antibody IgG (Completed)    Hepatitis B Surface Antibody (Completed)          Patient has been advised of split billing requirements and indicates understanding: Yes  COUNSELING:  Reviewed preventive health counseling, as reflected in patient instructions       Regular exercise       Healthy diet/nutrition    Estimated body mass index is 26.05 kg/m  as calculated from the following:    Height as of this encounter: 1.567 m (5' 1.69\").    Weight as of this encounter: 64 kg (141 lb).    Weight management plan: " Discussed healthy diet and exercise guidelines    She reports that she quit smoking about 10 years ago. Her smoking use included cigarettes. She smoked 0.50 packs per day. She has never used smokeless tobacco.  Answers for HPI/ROS submitted by the patient on 6/21/2021   Annual Exam:  If you checked off any problems, how difficult have these problems made it for you to do your work, take care of things at home, or get along with other people?: Somewhat difficult  PHQ9 TOTAL SCORE: 5  NEGAR 7 TOTAL SCORE: 2      Counseling Resources:  ATP IV Guidelines  Pooled Cohorts Equation Calculator  Breast Cancer Risk Calculator  BRCA-Related Cancer Risk Assessment: FHS-7 Tool  FRAX Risk Assessment  ICSI Preventive Guidelines  Dietary Guidelines for Americans, 2010  USDA's MyPlate  ASA Prophylaxis  Lung CA Screening    Vane Naranjo MD  [facility]

## 2021-06-21 ENCOUNTER — OFFICE VISIT (OUTPATIENT)
Dept: FAMILY MEDICINE | Facility: OTHER | Age: 31
End: 2021-06-21
Payer: COMMERCIAL

## 2021-06-21 VITALS
WEIGHT: 141 LBS | TEMPERATURE: 97.1 F | RESPIRATION RATE: 14 BRPM | HEIGHT: 62 IN | DIASTOLIC BLOOD PRESSURE: 70 MMHG | BODY MASS INDEX: 25.95 KG/M2 | HEART RATE: 60 BPM | OXYGEN SATURATION: 98 % | SYSTOLIC BLOOD PRESSURE: 104 MMHG

## 2021-06-21 DIAGNOSIS — R53.83 FATIGUE, UNSPECIFIED TYPE: ICD-10-CM

## 2021-06-21 DIAGNOSIS — F33.1 MODERATE EPISODE OF RECURRENT MAJOR DEPRESSIVE DISORDER (H): ICD-10-CM

## 2021-06-21 DIAGNOSIS — Z13.220 SCREENING FOR LIPOID DISORDERS: ICD-10-CM

## 2021-06-21 DIAGNOSIS — Z11.1 SCREENING EXAMINATION FOR PULMONARY TUBERCULOSIS: ICD-10-CM

## 2021-06-21 DIAGNOSIS — Z13.1 SCREENING FOR DIABETES MELLITUS: ICD-10-CM

## 2021-06-21 DIAGNOSIS — Z23 NEED FOR VACCINATION: ICD-10-CM

## 2021-06-21 DIAGNOSIS — Z00.00 ROUTINE GENERAL MEDICAL EXAMINATION AT A HEALTH CARE FACILITY: Primary | ICD-10-CM

## 2021-06-21 DIAGNOSIS — F41.1 GENERALIZED ANXIETY DISORDER: ICD-10-CM

## 2021-06-21 DIAGNOSIS — Z00.00 HEALTHCARE MAINTENANCE: ICD-10-CM

## 2021-06-21 DIAGNOSIS — Z11.59 NEED FOR HEPATITIS C SCREENING TEST: ICD-10-CM

## 2021-06-21 PROBLEM — N90.89 HEMATOMA OF LABIA MAJORA: Status: RESOLVED | Noted: 2020-07-08 | Resolved: 2021-06-21

## 2021-06-21 LAB
ALBUMIN SERPL-MCNC: 4 G/DL (ref 3.4–5)
ALP SERPL-CCNC: 75 U/L (ref 40–150)
ALT SERPL W P-5'-P-CCNC: 27 U/L (ref 0–50)
ANION GAP SERPL CALCULATED.3IONS-SCNC: 3 MMOL/L (ref 3–14)
AST SERPL W P-5'-P-CCNC: 22 U/L (ref 0–45)
BILIRUB SERPL-MCNC: 0.9 MG/DL (ref 0.2–1.3)
BUN SERPL-MCNC: 13 MG/DL (ref 7–30)
CALCIUM SERPL-MCNC: 8.5 MG/DL (ref 8.5–10.1)
CHLORIDE SERPL-SCNC: 108 MMOL/L (ref 94–109)
CHOLEST SERPL-MCNC: 238 MG/DL
CO2 SERPL-SCNC: 28 MMOL/L (ref 20–32)
CREAT SERPL-MCNC: 0.67 MG/DL (ref 0.52–1.04)
ERYTHROCYTE [DISTWIDTH] IN BLOOD BY AUTOMATED COUNT: 16.9 % (ref 10–15)
GFR SERPL CREATININE-BSD FRML MDRD: >90 ML/MIN/{1.73_M2}
GLUCOSE SERPL-MCNC: 84 MG/DL (ref 70–99)
HCT VFR BLD AUTO: 35.6 % (ref 35–47)
HDLC SERPL-MCNC: 87 MG/DL
HGB BLD-MCNC: 10.8 G/DL (ref 11.7–15.7)
LDLC SERPL CALC-MCNC: 140 MG/DL
MCH RBC QN AUTO: 23.9 PG (ref 26.5–33)
MCHC RBC AUTO-ENTMCNC: 30.3 G/DL (ref 31.5–36.5)
MCV RBC AUTO: 79 FL (ref 78–100)
NONHDLC SERPL-MCNC: 151 MG/DL
PLATELET # BLD AUTO: 234 10E9/L (ref 150–450)
POTASSIUM SERPL-SCNC: 4.5 MMOL/L (ref 3.4–5.3)
PROT SERPL-MCNC: 7.5 G/DL (ref 6.8–8.8)
RBC # BLD AUTO: 4.52 10E12/L (ref 3.8–5.2)
SODIUM SERPL-SCNC: 139 MMOL/L (ref 133–144)
TRIGL SERPL-MCNC: 54 MG/DL
WBC # BLD AUTO: 5 10E9/L (ref 4–11)

## 2021-06-21 PROCEDURE — 86787 VARICELLA-ZOSTER ANTIBODY: CPT | Performed by: FAMILY MEDICINE

## 2021-06-21 PROCEDURE — 90471 IMMUNIZATION ADMIN: CPT | Performed by: FAMILY MEDICINE

## 2021-06-21 PROCEDURE — 80061 LIPID PANEL: CPT | Performed by: FAMILY MEDICINE

## 2021-06-21 PROCEDURE — 86803 HEPATITIS C AB TEST: CPT | Performed by: FAMILY MEDICINE

## 2021-06-21 PROCEDURE — 86481 TB AG RESPONSE T-CELL SUSP: CPT | Performed by: FAMILY MEDICINE

## 2021-06-21 PROCEDURE — 99213 OFFICE O/P EST LOW 20 MIN: CPT | Mod: 25 | Performed by: FAMILY MEDICINE

## 2021-06-21 PROCEDURE — 36415 COLL VENOUS BLD VENIPUNCTURE: CPT | Performed by: FAMILY MEDICINE

## 2021-06-21 PROCEDURE — 99395 PREV VISIT EST AGE 18-39: CPT | Mod: 25 | Performed by: FAMILY MEDICINE

## 2021-06-21 PROCEDURE — 80053 COMPREHEN METABOLIC PANEL: CPT | Performed by: FAMILY MEDICINE

## 2021-06-21 PROCEDURE — 85027 COMPLETE CBC AUTOMATED: CPT | Performed by: FAMILY MEDICINE

## 2021-06-21 PROCEDURE — 90707 MMR VACCINE SC: CPT | Performed by: FAMILY MEDICINE

## 2021-06-21 PROCEDURE — 86706 HEP B SURFACE ANTIBODY: CPT | Performed by: FAMILY MEDICINE

## 2021-06-21 RX ORDER — FLUOXETINE 10 MG/1
CAPSULE ORAL
Qty: 270 CAPSULE | Refills: 1 | Status: SHIPPED | OUTPATIENT
Start: 2021-06-21

## 2021-06-21 ASSESSMENT — ENCOUNTER SYMPTOMS
FREQUENCY: 0
ARTHRALGIAS: 0
PARESTHESIAS: 0
HEMATOCHEZIA: 0
ABDOMINAL PAIN: 0
BREAST MASS: 0
FEVER: 0
CHILLS: 0
EYE PAIN: 0
CONSTIPATION: 0
SHORTNESS OF BREATH: 0
SORE THROAT: 0
PALPITATIONS: 0
COUGH: 0
HEMATURIA: 0
JOINT SWELLING: 0
NERVOUS/ANXIOUS: 0
HEADACHES: 0
WEAKNESS: 0
DIARRHEA: 0
MYALGIAS: 0
DYSURIA: 0
HEARTBURN: 0
DIZZINESS: 0
NAUSEA: 0

## 2021-06-21 ASSESSMENT — ANXIETY QUESTIONNAIRES
3. WORRYING TOO MUCH ABOUT DIFFERENT THINGS: SEVERAL DAYS
6. BECOMING EASILY ANNOYED OR IRRITABLE: NOT AT ALL
GAD7 TOTAL SCORE: 2
2. NOT BEING ABLE TO STOP OR CONTROL WORRYING: NOT AT ALL
7. FEELING AFRAID AS IF SOMETHING AWFUL MIGHT HAPPEN: NOT AT ALL
7. FEELING AFRAID AS IF SOMETHING AWFUL MIGHT HAPPEN: NOT AT ALL
4. TROUBLE RELAXING: SEVERAL DAYS
GAD7 TOTAL SCORE: 2
5. BEING SO RESTLESS THAT IT IS HARD TO SIT STILL: NOT AT ALL
GAD7 TOTAL SCORE: 2
1. FEELING NERVOUS, ANXIOUS, OR ON EDGE: NOT AT ALL

## 2021-06-21 ASSESSMENT — PATIENT HEALTH QUESTIONNAIRE - PHQ9
SUM OF ALL RESPONSES TO PHQ QUESTIONS 1-9: 5
SUM OF ALL RESPONSES TO PHQ QUESTIONS 1-9: 5
10. IF YOU CHECKED OFF ANY PROBLEMS, HOW DIFFICULT HAVE THESE PROBLEMS MADE IT FOR YOU TO DO YOUR WORK, TAKE CARE OF THINGS AT HOME, OR GET ALONG WITH OTHER PEOPLE: SOMEWHAT DIFFICULT

## 2021-06-21 ASSESSMENT — MIFFLIN-ST. JEOR: SCORE: 1297.95

## 2021-06-21 NOTE — RESULT ENCOUNTER NOTE
Labs show signs of mild anemia. Advise daily iron supplementation for 3 months and advise a recheck. If no improvement , will consider further eval at that time

## 2021-06-21 NOTE — NURSING NOTE
Prior to immunization administration, verified patients identity using patient s name and date of birth. Please see Immunization Activity for additional information.     Screening Questionnaire for Adult Immunization    Are you sick today?   No   Do you have allergies to medications, food, a vaccine component or latex?   No   Have you ever had a serious reaction after receiving a vaccination?   No   Do you have a long-term health problem with heart, lung, kidney, or metabolic disease (e.g., diabetes), asthma, a blood disorder, no spleen, complement component deficiency, a cochlear implant, or a spinal fluid leak?  Are you on long-term aspirin therapy?   No   Do you have cancer, leukemia, HIV/AIDS, or any other immune system problem?   No   Do you have a parent, brother, or sister with an immune system problem?   No   In the past 3 months, have you taken medications that affect  your immune system, such as prednisone, other steroids, or anticancer drugs; drugs for the treatment of rheumatoid arthritis, Crohn s disease, or psoriasis; or have you had radiation treatments?   No   Have you had a seizure, or a brain or other nervous system problem?   No   During the past year, have you received a transfusion of blood or blood    products, or been given immune (gamma) globulin or antiviral drug?   No   For women: Are you pregnant or is there a chance you could become       pregnant during the next month?   No   Have you received any vaccinations in the past 4 weeks?   No     Immunization questionnaire answers were all negative.        Per orders of Dr. Naranjo, injection of MMR given by Viv Romero MA. Patient instructed to remain in clinic for 15 minutes afterwards, and to report any adverse reaction to me immediately.       Screening performed by Viv Romero MA on 6/21/2021 at 11:49 AM.

## 2021-06-21 NOTE — ASSESSMENT & PLAN NOTE
Reviewed immunization records.  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  Will follow results and complete forms if needed.

## 2021-06-21 NOTE — ASSESSMENT & PLAN NOTE
She has been reporting increased fatigue over the past year and has a history of anemia in the past.  Would like to have labs completed to follow-up.

## 2021-06-21 NOTE — ASSESSMENT & PLAN NOTE
Symptoms well controlled on the current dose of Prozac.  She has not been taking it consistently though.  Encouraged regular use.  Refills provided.  Follow-up in 6 months.

## 2021-06-22 LAB
HBV SURFACE AB SERPL IA-ACNC: 4.23 M[IU]/ML
HCV AB SERPL QL IA: NONREACTIVE
VZV IGG SER QL IA: 1.7 AI (ref 0–0.8)

## 2021-06-22 ASSESSMENT — ANXIETY QUESTIONNAIRES: GAD7 TOTAL SCORE: 2

## 2021-06-22 ASSESSMENT — PATIENT HEALTH QUESTIONNAIRE - PHQ9: SUM OF ALL RESPONSES TO PHQ QUESTIONS 1-9: 5

## 2021-06-22 NOTE — RESULT ENCOUNTER NOTE
Ms. Sagastume    Your recent test results are attached.  Immunity noted to chickenpox but the hepatitis B test does not show sign of prior immunity or vaccination.  She would need to complete the hepatitis B vaccination series.    Moderately elevated cholesterol levels.  Advise low-fat diet, regular exercise to help improve this.  If you have any questions or concerns please contact me via My Chart or call the clinic at [phone]     Thank You  Vane Naranjo MD.

## 2021-06-23 LAB
GAMMA INTERFERON BACKGROUND BLD IA-ACNC: 0.05 IU/ML
M TB IFN-G CD4+ BCKGRND COR BLD-ACNC: 9.95 IU/ML
M TB TUBERC IFN-G BLD QL: NEGATIVE
MITOGEN IGNF BCKGRD COR BLD-ACNC: 0 IU/ML
MITOGEN IGNF BCKGRD COR BLD-ACNC: 0 IU/ML

## 2021-06-24 NOTE — RESULT ENCOUNTER NOTE
Ms. Sagastume    Your recent test results are attached.  Active TB test    If you have any questions or concerns please contact me via My Chart or call the clinic at [phone]     Thank You  Vane Naranjo MD.

## 2021-08-14 ENCOUNTER — TRANSFERRED RECORDS (OUTPATIENT)
Dept: HEALTH INFORMATION MANAGEMENT | Facility: CLINIC | Age: 31
End: 2021-08-14

## 2021-09-26 ENCOUNTER — HEALTH MAINTENANCE LETTER (OUTPATIENT)
Age: 31
End: 2021-09-26

## 2022-08-28 ENCOUNTER — HEALTH MAINTENANCE LETTER (OUTPATIENT)
Age: 32
End: 2022-08-28

## 2023-03-01 NOTE — TELEPHONE ENCOUNTER
Already refilled 12/09/20.     Asha Carpenter PA-C    
Pending Prescriptions:                       Disp   Refills    FLUoxetine (PROZAC) 10 MG capsule          90 cap*1        Sig: Take 3 capsules (30 mg) by mouth daily    Routing refill request to provider for review/approval because:  Labs out of range:    PHQ-9 score:    PHQ 10/2/2020   PHQ-9 Total Score 6   Q9: Thoughts of better off dead/self-harm past 2 weeks Not at all                     
Prozac refill requested - Trying to get refill on her fluoxetine - [facility] in [place].    Unable to fill per protocol - patient needs PHQ9 completed - advised patient message would be sent to clinic for refill request.    Eunice Yoo RN on 12/8/2020 at 3:27 PM    
Pt is 51-year-old female domiciled w/mom (now ) who was pt's primary caretaker, w/PPHx Bipolar disorder on lithium and past medical history of diabetes on metformin, who was BIBEMS after appearing abnormal to family. no hx is found in psyckes. Patient was brought in to ED for evaluation , after pt's cousin discovered last evening that  pt's mom dead   pt was acting strange bringing blankets to cover her  mom saying she is sleeping . As per report pt's mother likely passed away several days ago before she was discovered   in the house.    Suspect pt is likely with psychotic d/o at baseline . However, pt usually does not seem this out of touch with reality- believing her mom and dad are still alive. Pt reportedly with a long hx of bipolar disorder on lithium, likely has not been taking medications .     2----no behavioral issues. No evidence of acute psychiatric issues that would warrant an involuntary psychiatric admission. Plan is for a placement at this time, and team is working towards that. Psychiatry will continue to follow up with the patient over the course of this admission. Family in agreement with this plan.     2/3-- no impulsivity, calm, compliant with meds and care. From collateral, patient has chronic psychosis at baseline, who required parents support and coaxing for adl's, compliance etc. There was no imminent risk to others or herself even with baseline psychosis. Here at [facility], there is no evidence of acute psychiatric symptoms that would warrant an inpatient psychiatric admission. Patient is at her chronic psychiatric baseline at this time.     : patient tremor noted to be less significant on today's exam. Patient remains without PRNs. Calm on interview with no irritability. Limited insight.   : a&o, calm, cooperative, med compliant, no prn's, denies si, denies ah/vh, concrete, discussed with cousin bringing in clothes for patient     Primary SW has requested a medical decisional capacity evaluation. See below.  Patient lacks insight into her medical or psychiatric diagnosis, does not believe she has hypothyroidism, or a bipolar/schizophrenia. She is unable to appreciate the need for compliance with medications even though she has been passively accepting of medications in a supportive environment like [facility]. Due to this lack of insight she is unable to appreciate the benefits vs risks of compliance vs noncompliance or negotiate options. Unable to make an informed decision regarding the management of above.   Based on the above the patient appears to lack the capacity to make medical decisions at this time. Please engage roney Crook in discussions.    PLAN  ---defer observation to primary team  -- CONTINUE Risperidone 1mg q 7am+ 1mg q 8PM PO. Holding steady at this dose.   -- CONTINUE Cogentin 0.5mg bid po to the regimen.   -- AGGRESSION--- Haldol 2mg q 6hrs prn IM/IV/PO---ensure qtc<500  -- DISPOSITION-- does not warrant an inpatient psych admission. She will need placement at this time.  
Relayed message. Closing.     Miranda Nuno, CMA    
Routing to [facility] as it looks like Sugey Carpenter PA-C, most recently prescribed pt's Prozac.    Chikis Yoder RN    
Pt is 51-year-old female domiciled w/mom (now ) who was pt's primary caretaker, w/PPHx Bipolar disorder on lithium and past medical history of diabetes on metformin, who was BIBEMS after appearing abnormal to family. no hx is found in psyckes. Patient was brought in to ED for evaluation , after pt's cousin discovered last evening that  pt's mom dead   pt was acting strange bringing blankets to cover her  mom saying she is sleeping . As per report pt's mother likely passed away several days ago before she was discovered   in the house.    Suspect pt is likely with psychotic d/o at baseline . However, pt usually does not seem this out of touch with reality- believing her mom and dad are still alive. Pt reportedly with a long hx of bipolar disorder on lithium, likely has not been taking medications . At this time, pt is not able to care for herself and will need further care. Given concern that this is a psychiatric decompensation from pt's baseline (vs grief reaction), pt will likely require psychiatric hospitalization. Medically admitted for abnormal TFTs.    PLAN  - defer observation to primary team  --- patient may benefit from 7s placement   - HOLD standing psychiatric medications at this time while being treated for thyroid abnormalities   ---  lithium level < 0.1  - please obtain EKG  --- antipsychotics can only be given if qtc < 500   ---- PRN for agitation: haldol 2mg q6hrs  
Pt is 51-year-old female domiciled w/mom (now ) who was pt's primary caretaker, w/PPHx Bipolar disorder on lithium and past medical history of diabetes on metformin, who was BIBEMS after appearing abnormal to family. no hx is found in psyckes. Patient was brought in to ED for evaluation , after pt's cousin discovered last evening that  pt's mom dead   pt was acting strange bringing blankets to cover her  mom saying she is sleeping . As per report pt's mother likely passed away several days ago before she was discovered   in the house.    Suspect pt is likely with psychotic d/o at baseline . However, pt usually does not seem this out of touch with reality- believing her mom and dad are still alive. Pt reportedly with a long hx of bipolar disorder on lithium, likely has not been taking medications .     2----no behavioral issues. No evidence of acute psychiatric issues that would warrant an involuntary psychiatric admission. Plan is for a placement at this time, and team is working towards that. Psychiatry will continue to follow up with the patient over the course of this admission. Family in agreement with this plan.   2/3-- no impulsivity, calm, compliant with meds and care. From collateral, patient has chronic psychosis at baseline, who required parents support and coaxing for adl's, compliance etc. There was no imminent risk to others or herself even with baseline psychosis. Here at [facility], there is no evidence of acute psychiatric symptoms that would warrant an inpatient psychiatric admission. Patient is at her chronic psychiatric baseline at this time.   : patient tremor noted to be less significant on today's exam. Patient remains without PRNs. Calm on interview with no irritability. Limited insight.   : a&o, calm, cooperative, med compliant, no prn's, denies si, denies ah/vh, concrete, discussed with cousin bringing in clothes for patient   : No gross change  3/1: no PRNs. calm, cooperative - behavior in control. insight remains limited.     Primary SW has requested a medical decisional capacity evaluation. See below.  Patient lacks insight into her medical or psychiatric diagnosis, does not believe she has hypothyroidism, or a bipolar/schizophrenia. She also does not seem to understand or appreicate that she has COVID. She is unable to appreciate the need for compliance with medications even though she has been passively accepting of medications in a supportive environment like [facility]. Due to this lack of insight she is unable to appreciate the benefits vs risks of compliance vs noncompliance or negotiate options. Unable to make an informed decision regarding the management of above.   Based on the above the patient appears to lack the capacity to make medical decisions at this time. Please engage cousin Araman in discussions.    PLAN  ---defer observation to primary team  -- CONTINUE Risperidone 1mg q 7am+ 1mg q 8PM PO. Holding steady at this dose.   -- CONTINUE Cogentin 0.5mg bid po to the regimen.   -- AGGRESSION--- Haldol 2mg q 6hrs prn IM/IV/PO---ensure qtc<500  -- DISPOSITION-- does not warrant an inpatient psych admission. She will need placement at this time.  
Pt is 51-year-old female domiciled w/mom (now ) who was pt's primary caretaker, w/PPHx Bipolar disorder on lithium and past medical history of diabetes on metformin, who was BIBEMS after appearing abnormal to family. no hx is found in psyckes. Patient was brought in to ED for evaluation , after pt's cousin discovered last evening that  pt's mom dead   pt was acting strange bringing blankets to cover her  mom saying she is sleeping . As per report pt's mother likely passed away several days ago before she was discovered   in the house.    Suspect pt is likely with psychotic d/o at baseline . However, pt usually does not seem this out of touch with reality- believing her mom and dad are still alive. Pt reportedly with a long hx of bipolar disorder on lithium, likely has not been taking medications .     --- intermittent agitation, suspecting psychosis making her at risk for agitation similar to yesterday.  --- compliant, guarded, but likely has psychotic symptoms at baseline    --- remains superficial, no insight. But no acute psychiatric symptoms that would warrant an inpatient psych admission. She will need placement at this time.     --- irritable today. No aggression or agitation.     PLAN  - defer observation to primary team  --- patient may benefit from 7s placement   -- Will NOT RESUME LITHIUM at this time.  -- CONTINUE Risperidone 0.5mg q 7am+ 1mg q 8PM PO--- to target psychosis, agitation, mood.    -- AGGRESSION--- Haldol 2mg q 6hrs prn IM/IV/PO---ensure qtc<500  -- DISPOSITION-- no acute psychiatric symptoms that would warrant an inpatient psych admission. She will need placement at this time.
Pt is 51-year-old female domiciled w/mom (now ) who was pt's primary caretaker, w/PPHx Bipolar disorder on lithium and past medical history of diabetes on metformin, who was BIBEMS after appearing abnormal to family. no hx is found in psyckes. Patient was brought in to ED for evaluation , after pt's cousin discovered last evening that  pt's mom dead   pt was acting strange bringing blankets to cover her  mom saying she is sleeping . As per report pt's mother likely passed away several days ago before she was discovered   in the house.    Suspect pt is likely with psychotic d/o at baseline . However, pt usually does not seem this out of touch with reality- believing her mom and dad are still alive. Pt reportedly with a long hx of bipolar disorder on lithium, likely has not been taking medications .     --- intermittent agitation, suspecting psychosis making her at risk for agitation similar to yesterday.  --- compliant, guarded, but likely has psychotic symptoms at baseline    --- remains superficial, no insight. But no acute psychiatric symptoms that would warrant an inpatient psych admission. She will need placement at this time.     --- irritable today. No aggression or agitation.     PLAN  - defer observation to primary team  --- patient may benefit from 7s placement   -- Will NOT RESUME LITHIUM at this time.  -- INCREASE dose of Risperidone to 0.5mg q 7am+ 1mg q 8PM PO--- to target psychosis, agitation, mood. **Give stat dose of Risperidone 0.5mg now .   -- AGGRESSION--- Haldol 2mg q 6hrs prn IM/IV/PO---ensure qtc<500  -- DISPOSITION-- no acute psychiatric symptoms that would warrant an inpatient psych admission. She will need placement at this time.  
Pt is 51-year-old female domiciled w/mom (now ) who was pt's primary caretaker, w/PPHx Bipolar disorder on lithium and past medical history of diabetes on metformin, who was BIBEMS after appearing abnormal to family. no hx is found in psyckes. Patient was brought in to ED for evaluation , after pt's cousin discovered last evening that  pt's mom dead   pt was acting strange bringing blankets to cover her  mom saying she is sleeping . As per report pt's mother likely passed away several days ago before she was discovered   in the house.    Suspect pt is likely with psychotic d/o at baseline . However, pt usually does not seem this out of touch with reality- believing her mom and dad are still alive. Pt reportedly with a long hx of bipolar disorder on lithium, likely has not been taking medications .     2/2----no behavioral issues. No evidence of acute psychiatric issues that would warrant an involuntary psychiatric admission. Plan is for a placement at this time, and team is working towards that. Psychiatry will continue to follow up with the patient over the course of this admission. Family in agreement with this plan.     2/3-- no impulsivity, calm, compliant with meds and care. From collateral, patient has chronic psychosis at baseline, who required parents support and coaxing for adl's, compliance etc. There was no imminent risk to others or herself even with baseline psychosis. Here at [facility], there is no evidence of acute psychiatric symptoms that would warrant an inpatient psychiatric admission. Patient is at her chronic psychiatric baseline at this time.     Primary SW has requested a medical decisional capacity evaluation. See below.  Patient lacks insight into her medical or psychiatric diagnosis, does not believe she has hypothyroidism, or a bipolar/schizophrenia. She is unable to appreciate the need for compliance with medications even though she has been passively accepting of medications in a supportive environment like [facility]. Due to this lack of insight she is unable to appreciate the benefits vs risks of compliance vs noncompliance or negotiate options. Unable to make an informed decision regarding the management of above.   Based on the above the patient appears to lack the capacity to make medical decisions at this time. Please engage cousin Armaan in discussions.    PLAN  ---defer observation to primary team    -- CONTINUE Risperidone 1mg q 7am+ 1mg q 8PM PO. Holding steady at this dose.   -- ADD Cogentin 0.5mg bid po to the regimen.   -- AGGRESSION--- Haldol 2mg q 6hrs prn IM/IV/PO---ensure qtc<500  -- DISPOSITION-- does not warrant an inpatient psych admission. She will need placement at this time.  
Pt is 51-year-old female domiciled w/mom (now ) who was pt's primary caretaker, w/PPHx Bipolar disorder on lithium and past medical history of diabetes on metformin, who was BIBEMS after appearing abnormal to family. no hx is found in psyckes. Patient was brought in to ED for evaluation , after pt's cousin discovered last evening that  pt's mom dead   pt was acting strange bringing blankets to cover her  mom saying she is sleeping . As per report pt's mother likely passed away several days ago before she was discovered   in the house.    Suspect pt is likely with psychotic d/o at baseline . However, pt usually does not seem this out of touch with reality- believing her mom and dad are still alive. Pt reportedly with a long hx of bipolar disorder on lithium, likely has not been taking medications .     2----no behavioral issues. No evidence of acute psychiatric issues that would warrant an involuntary psychiatric admission. Plan is for a placement at this time, and team is working towards that. Psychiatry will continue to follow up with the patient over the course of this admission. Family in agreement with this plan.   2/3-- no impulsivity, calm, compliant with meds and care. From collateral, patient has chronic psychosis at baseline, who required parents support and coaxing for adl's, compliance etc. There was no imminent risk to others or herself even with baseline psychosis. Here at [facility], there is no evidence of acute psychiatric symptoms that would warrant an inpatient psychiatric admission. Patient is at her chronic psychiatric baseline at this time.   : patient tremor noted to be less significant on today's exam. Patient remains without PRNs. Calm on interview with no irritability. Limited insight.   : a&o, calm, cooperative, med compliant, no prn's, denies si, denies ah/vh, concrete, discussed with cousin bringing in clothes for patient   : No gross change    Primary SW has requested a medical decisional capacity evaluation. See below.  Patient lacks insight into her medical or psychiatric diagnosis, does not believe she has hypothyroidism, or a bipolar/schizophrenia. She also does not seem to understand or appreicate that she has COVID. She is unable to appreciate the need for compliance with medications even though she has been passively accepting of medications in a supportive environment like [facility]. Due to this lack of insight she is unable to appreciate the benefits vs risks of compliance vs noncompliance or negotiate options. Unable to make an informed decision regarding the management of above.   Based on the above the patient appears to lack the capacity to make medical decisions at this time. Please engage roney Crook in discussions.    PLAN  ---defer observation to primary team  -- CONTINUE Risperidone 1mg q 7am+ 1mg q 8PM PO. Holding steady at this dose.   -- CONTINUE Cogentin 0.5mg bid po to the regimen.   -- AGGRESSION--- Haldol 2mg q 6hrs prn IM/IV/PO---ensure qtc<500  -- DISPOSITION-- does not warrant an inpatient psych admission. She will need placement at this time.  
Pt is 51-year-old female domiciled w/mom (now ) who was pt's primary caretaker, w/PPHx Bipolar disorder on lithium and past medical history of diabetes on metformin, who was BIBEMS after appearing abnormal to family. no hx is found in psyckes. Patient was brought in to ED for evaluation , after pt's cousin discovered last evening that  pt's mom dead   pt was acting strange bringing blankets to cover her  mom saying she is sleeping . As per report pt's mother likely passed away several days ago before she was discovered   in the house.    Suspect pt is likely with psychotic d/o at baseline . However, pt usually does not seem this out of touch with reality- believing her mom and dad are still alive. Pt reportedly with a long hx of bipolar disorder on lithium, likely has not been taking medications .     --- intermittent agitation, suspecting psychosis making her at risk for agitation similar to yesterday.  --- compliant, guarded, but likely has psychotic symptoms at baseline    PLAN  - defer observation to primary team  --- patient may benefit from 7s placement   -- Will NOT RESUME LITHIUM at this time.  -- INCREASE dose of Risperidone to 1mg q 8PM PO--- to target psychosis, agitation, mood. Monitor mentation.   -- AGGRESSION--- Haldol 2mg q 6hrs prn IM/IV/PO---ensure qtc<500  -- DISPOSITION-- Less likely will need an inpatient psychiatric admission. Will likely need placement  
Pt is 51-year-old female domiciled w/mom (now ) who was pt's primary caretaker, w/PPHx Bipolar disorder on lithium and past medical history of diabetes on metformin, who was BIBEMS after appearing abnormal to family. no hx is found in psyckes. Patient was brought in to ED for evaluation , after pt's cousin discovered last evening that  pt's mom dead   pt was acting strange bringing blankets to cover her  mom saying she is sleeping . As per report pt's mother likely passed away several days ago before she was discovered   in the house.    Suspect pt is likely with psychotic d/o at baseline . However, pt usually does not seem this out of touch with reality- believing her mom and dad are still alive. Pt reportedly with a long hx of bipolar disorder on lithium, likely has not been taking medications .     2/----no behavioral issues. No evidence of acute psychiatric issues that would warrant an involuntary psychiatric admission. Plan is for a placement at this time, and team is working towards that. Psychiatry will continue to follow up with the patient over the course of this admission. Family in agreement with this plan.     2/3-- no impulsivity, calm, compliant with meds and care. From collateral, patient has chronic psychosis at baseline, who required parents support and coaxing for adl's, compliance etc. There was no imminent risk to others or herself even with baseline psychosis. Here at [facility], there is no evidence of acute psychiatric symptoms that would warrant an inpatient psychiatric admission. Patient is at her chronic psychiatric baseline at this time.     2: patient tremor noted to be less significant on today's exam. Patient remains without PRNs. Calm on interview with no irritability. Limited insight.     Primary SW has requested a medical decisional capacity evaluation. See below.  Patient lacks insight into her medical or psychiatric diagnosis, does not believe she has hypothyroidism, or a bipolar/schizophrenia. She is unable to appreciate the need for compliance with medications even though she has been passively accepting of medications in a supportive environment like [facility]. Due to this lack of insight she is unable to appreciate the benefits vs risks of compliance vs noncompliance or negotiate options. Unable to make an informed decision regarding the management of above.   Based on the above the patient appears to lack the capacity to make medical decisions at this time. Please engage cousin Armaan in discussions.    PLAN  ---defer observation to primary team  -- CONTINUE Risperidone 1mg q 7am+ 1mg q 8PM PO. Holding steady at this dose.   -- ADDED Cogentin 0.5mg bid po to the regimen.   -- AGGRESSION--- Haldol 2mg q 6hrs prn IM/IV/PO---ensure qtc<500  -- DISPOSITION-- does not warrant an inpatient psych admission. She will need placement at this time.  
Pt is 51-year-old female domiciled w/mom (now ) who was pt's primary caretaker, w/PPHx Bipolar disorder on lithium and past medical history of diabetes on metformin, who was BIBEMS after appearing abnormal to family. no hx is found in psyckes. Patient was brought in to ED for evaluation , after pt's cousin discovered last evening that  pt's mom dead   pt was acting strange bringing blankets to cover her  mom saying she is sleeping . As per report pt's mother likely passed away several days ago before she was discovered   in the house.    Suspect pt is likely with psychotic d/o at baseline . However, pt usually does not seem this out of touch with reality- believing her mom and dad are still alive. Pt reportedly with a long hx of bipolar disorder on lithium, likely has not been taking medications .     --- intermittent agitation, suspecting psychosis making her at risk for agitation similar to yesterday.  --- compliant, guarded, but likely has psychotic symptoms at baseline    --- remains superficial, no insight. But no acute psychiatric symptoms that would warrant an inpatient psych admission. She will need placement at this time.     PLAN  - defer observation to primary team  --- patient may benefit from 7s placement   -- Will NOT RESUME LITHIUM at this time.  -- CONTINUE Risperidone 1mg q 8PM PO--- to target psychosis, agitation, mood. Monitor mentation.   -- AGGRESSION--- Haldol 2mg q 6hrs prn IM/IV/PO---ensure qtc<500  -- DISPOSITION-- no acute psychiatric symptoms that would warrant an inpatient psych admission. She will need placement at this time.   
Pt is 51-year-old female domiciled w/mom (now ) who was pt's primary caretaker, w/PPHx Bipolar disorder on lithium and past medical history of diabetes on metformin, who was BIBEMS after appearing abnormal to family. no hx is found in psyckes. Patient was brought in to ED for evaluation , after pt's cousin discovered last evening that  pt's mom dead   pt was acting strange bringing blankets to cover her  mom saying she is sleeping . As per report pt's mother likely passed away several days ago before she was discovered   in the house.    Suspect pt is likely with psychotic d/o at baseline . However, pt usually does not seem this out of touch with reality- believing her mom and dad are still alive. Pt reportedly with a long hx of bipolar disorder on lithium, likely has not been taking medications .     --- intermittent agitation, suspecting psychosis making her at risk for agitation similar to yesterday.  --- compliant, guarded, but likely has psychotic symptoms at baseline    --- remains superficial, no insight. But no acute psychiatric symptoms that would warrant an inpatient psych admission. She will need placement at this time.     --- irritable today. No aggression or agitation.   --- can be intermittently agitated.     PLAN  ---defer observation to primary team  --- patient may benefit from 7s placement   -- Will NOT RESUME LITHIUM at this time.  -- INCREASE Risperidone TO 1mg q 7am+ 1mg q 8PM PO--- to target psychosis, agitation, mood.    -- AGGRESSION--- Haldol 2mg q 6hrs prn IM/IV/PO---ensure qtc<500  -- DISPOSITION-- does not warrant an inpatient psych admission. She will need placement at this time.  
Pt is 51-year-old female domiciled w/mom (now ) who was pt's primary caretaker, w/PPHx Bipolar disorder on lithium and past medical history of diabetes on metformin, who was BIBEMS after appearing abnormal to family. no hx is found in psyckes. Patient was brought in to ED for evaluation , after pt's cousin discovered last evening that  pt's mom dead   pt was acting strange bringing blankets to cover her  mom saying she is sleeping . As per report pt's mother likely passed away several days ago before she was discovered   in the house.    Suspect pt is likely with psychotic d/o at baseline . However, pt usually does not seem this out of touch with reality- believing her mom and dad are still alive. Pt reportedly with a long hx of bipolar disorder on lithium, likely has not been taking medications .     --- intermittent agitation, suspecting psychosis making her at risk for agitation similar to yesterday.    PLAN  - defer observation to primary team  --- patient may benefit from 7s placement   -- Will NOT RESUME LITHIUM at this time.  -- START Risperidone 0.5mg q 8PM PO--- to target psychosis, agitation, mood. Monitor mentation.   -- AGGRESSION--- Haldol 2mg q 6hrs prn IM/IV/PO---ensure qtc<500  -- DISPOSITION-- TBD.   
Pt is 51-year-old female domiciled w/mom (now ) who was pt's primary caretaker, w/PPHx Bipolar disorder on lithium and past medical history of diabetes on metformin, who was BIBEMS after appearing abnormal to family. no hx is found in psyckes. Patient was brought in to ED for evaluation , after pt's cousin discovered last evening that  pt's mom dead   pt was acting strange bringing blankets to cover her  mom saying she is sleeping . As per report pt's mother likely passed away several days ago before she was discovered   in the house.    Suspect pt is likely with psychotic d/o at baseline . However, pt usually does not seem this out of touch with reality- believing her mom and dad are still alive. Pt reportedly with a long hx of bipolar disorder on lithium, likely has not been taking medications .     2/2----no behavioral issues. No evidence of acute psychiatric issues that would warrant an involuntary psychiatric admission. Plan is for a placement at this time, and team is working towards that. Psychiatry will continue to follow up with the patient over the course of this admission. Family in agreement with this plan.     PLAN  ---defer observation to primary team    -- CONTINUE Risperidone 1mg q 7am+ 1mg q 8PM PO. Holding steady at this dose.   -- ADD Cogentin 0.5mg bid po to the regimen.   -- AGGRESSION--- Haldol 2mg q 6hrs prn IM/IV/PO---ensure qtc<500  -- DISPOSITION-- does not warrant an inpatient psych admission. She will need placement at this time.

## 2023-09-24 ENCOUNTER — HEALTH MAINTENANCE LETTER (OUTPATIENT)
Age: 33
End: 2023-09-24

## 2024-03-27 NOTE — PATIENT INSTRUCTIONS
If you have labs or imaging done, the results will automatically release in [facility] without an interpretation.  Your health care professional will review those results and send an interpretation with recommendations as soon as possible, but this may be 1-3 business days.    If you have any questions regarding your visit, please contact your care team.     [employer] [facility]: [phone]  [facility] CLINIC HOURS TELEPHONE NUMBER   Robby ACKERMAN Nic .      Elva-JUAN MANUEL Diop-JUAN MANUEL Ortiz-Surgery Scheduler  Marysol-         Monday-[place]  8:00 am-4:00 pm  [facility]  Wednesday-[place] 8:00 am-4:00 pm  Thursday-[place] 8:00 am-4:00 pm    Typical Surgery Day Friday [facility]  [address] [place] [address]  PH: [phone] Fax    Imaging Scheduling all locations  PH: [phone]     [facility]  [address]  [place], [place] [address] [phone]    [facility]  [address] [place] [address]  PH: [phone]     **Surgeries** Our Surgery Schedulers will contact you to schedule. If you do not receive a call within 3 business days, please call [phone].  Urgent Care locations:  [facility]       Monday-Friday   10 am - 8 pm  Saturday and Sunday   9 am - 5 pm   [phone]   If you need a medication refill, please contact your pharmacy. Please allow 3 business days for your refill to be completed.  As always, Thank you for trusting us with your healthcare needs!

## 2024-04-01 ENCOUNTER — MYC MEDICAL ADVICE (OUTPATIENT)
Dept: OBGYN | Facility: CLINIC | Age: 34
End: 2024-04-01

## 2024-04-01 ENCOUNTER — OFFICE VISIT (OUTPATIENT)
Dept: OBGYN | Facility: CLINIC | Age: 34
End: 2024-04-01
Payer: COMMERCIAL

## 2024-04-01 VITALS
HEART RATE: 74 BPM | OXYGEN SATURATION: 100 % | DIASTOLIC BLOOD PRESSURE: 108 MMHG | BODY MASS INDEX: 26.42 KG/M2 | SYSTOLIC BLOOD PRESSURE: 154 MMHG | WEIGHT: 143 LBS

## 2024-04-01 DIAGNOSIS — R10.2 PELVIC PAIN IN FEMALE: ICD-10-CM

## 2024-04-01 DIAGNOSIS — N93.9 ABNORMAL UTERINE BLEEDING: Primary | ICD-10-CM

## 2024-04-01 DIAGNOSIS — Z11.3 ROUTINE SCREENING FOR STI (SEXUALLY TRANSMITTED INFECTION): ICD-10-CM

## 2024-04-01 DIAGNOSIS — B37.31 CANDIDIASIS OF VAGINA: ICD-10-CM

## 2024-04-01 LAB
CLUE CELLS: ABNORMAL
TRICHOMONAS, WET PREP: ABNORMAL
WBC'S/HIGH POWER FIELD, WET PREP: ABNORMAL
YEAST, WET PREP: PRESENT

## 2024-04-01 PROCEDURE — 87389 HIV-1 AG W/HIV-1&-2 AB AG IA: CPT | Performed by: GENERAL PRACTICE

## 2024-04-01 PROCEDURE — 87210 SMEAR WET MOUNT SALINE/INK: CPT | Performed by: GENERAL PRACTICE

## 2024-04-01 PROCEDURE — 86780 TREPONEMA PALLIDUM: CPT | Performed by: GENERAL PRACTICE

## 2024-04-01 PROCEDURE — 87340 HEPATITIS B SURFACE AG IA: CPT | Performed by: GENERAL PRACTICE

## 2024-04-01 PROCEDURE — 86803 HEPATITIS C AB TEST: CPT | Performed by: GENERAL PRACTICE

## 2024-04-01 PROCEDURE — 87591 N.GONORRHOEAE DNA AMP PROB: CPT | Performed by: GENERAL PRACTICE

## 2024-04-01 PROCEDURE — 36415 COLL VENOUS BLD VENIPUNCTURE: CPT | Performed by: GENERAL PRACTICE

## 2024-04-01 PROCEDURE — 99203 OFFICE O/P NEW LOW 30 MIN: CPT | Performed by: GENERAL PRACTICE

## 2024-04-01 PROCEDURE — 87491 CHLMYD TRACH DNA AMP PROBE: CPT | Performed by: GENERAL PRACTICE

## 2024-04-01 RX ORDER — FLUCONAZOLE 150 MG/1
150 TABLET ORAL ONCE
Qty: 1 TABLET | Refills: 0 | Status: SHIPPED | OUTPATIENT
Start: 2024-04-01 | End: 2024-04-01

## 2024-04-01 RX ORDER — ACETAMINOPHEN AND CODEINE PHOSPHATE 120; 12 MG/5ML; MG/5ML
0.35 SOLUTION ORAL DAILY
Qty: 84 TABLET | Refills: 3 | Status: SHIPPED | OUTPATIENT
Start: 2024-04-01

## 2024-04-01 NOTE — PROGRESS NOTES
HPI:   Bettye is a 33 year old  here for STD check and menstrual concerns. Had intercourse with new partner this month. No concerning symptoms. Has extreme, intense cramping which is unilateral and fluctuates laterality. Also has spotting of old blood on the next day. Symptoms are not every month but usually happen about 1 week before otherwise normal menses cycle. Periods normally last 7 days, 3 extremely heavy days. Saturates super tampons on about 1 hour. +Soiling. +Clots.        She is currently using BTL for birth control.        ROS:   Weight loss or gain: denies  Abdominal bloating / pain: As above  Appetite: normal  Energy: normal  Nausea / vomiting: denies  Fevers / chills / night sweats: denies  SOB / cough: denies  Chest pain / palpitations: denies  Diarrhea / constipation: denies  Bloody / tar-colored stools: denies  Urinary frequency / urgency / blood: denies  Headaches / vision changes: denies  Mouth sores: denies  Skin changes / rashes: denies      GYNHx:   Patient's last menstrual period was 2024.  Cycles: as above  Menorrhagia: +++  Dysmenorrhea: +, but much less than as teenager  Last paps:  : NILM/HPV-  Lab Results   Component Value Date    PAP NIL 2019    PAP ASC-US 2011     Prior abnormal pap: denies  History STI: denies      OBHx:  OB History    Para Term  AB Living   3 2 2 0 1 2   SAB IAB Ectopic Multiple Live Births   0 0 0 0 2      # Outcome Date GA Lbr Eric/2nd Weight Sex Delivery Anes PTL Lv   3 Term 16 39w0d  3.856 kg (8 lb 8 oz) M -SEC EPI  AIDEN   2 Term 10/18/11 40w0d  4.082 kg (9 lb) F    AIDEN      Complications: Shoulder Dystocia, Fourth degree perineal laceration during delivery, delivered   1 AB                 PSH:   Past Surgical History:   Procedure Laterality Date    AS REPAIR RECTO-VAG FISTULA      COSMETIC SURGERY      breast reduction    epesiotomy revision      GYN SURGERY       x1    GYN SURGERY       tubal ligation    IRRIGATION AND DEBRIDEMENT VAGINA N/A 2020    Procedure: Evacuation of Labial Hematoma;  Surgeon: Samantha Martinez MD;  Location: [facility] OR       PMH:  Past Medical History:   Diagnosis Date    NO ACTIVE PROBLEMS         MEDs   Current Outpatient Medications   Medication    norethindrone (MICRONOR) 0.35 MG tablet    FLUoxetine (PROZAC) 10 MG capsule     No current facility-administered medications for this visit.       Allergies:  Allergies   Allergen Reactions    Nkda [No Known Drug Allergy]        FamHx:  Family History   Problem Relation Age of Onset    Hypertension Father     Lipids Father     Cancer Maternal Grandmother         lymphoma    Heart Disease Paternal Grandfather         MI    Glaucoma No family hx of     Macular Degeneration No family hx of        SocHx:  Social History     Socioeconomic History    Marital status: Significant other     Spouse name: Not on file    Number of children: Not on file    Years of education: Not on file    Highest education level: Not on file   Occupational History    Not on file   Tobacco Use    Smoking status: Former     Packs/day: .5     Types: Cigarettes     Quit date: 2011     Years since quittin.1    Smokeless tobacco: Never   Substance and Sexual Activity    Alcohol use: Yes     Comment: see note     Drug use: No    Sexual activity: Yes     Partners: Male     Birth control/protection: Female Surgical     Comment: Tubal Ligation   Other Topics Concern    Parent/sibling w/ CABG, MI or angioplasty before 65F 55M? Not Asked   Social History Narrative    Not on file     Social Determinants of Health     Financial Resource Strain: Not on file   Food Insecurity: Not on file   Transportation Needs: Not on file   Physical Activity: Not on file   Stress: Not on file   Social Connections: Not on file   Interpersonal Safety: Not on file   Housing Stability: Not on file             PE:   BP (!) 154/108 (BP Location: Left arm, Patient Position:  Chair, Cuff Size: Adult Regular)   Pulse 74   Wt 64.9 kg (143 lb)   LMP 03/09/2024   SpO2 100%   Breastfeeding No   BMI 26.42 kg/m    Body mass index is 26.42 kg/m .    General Appearance:  healthy, alert, active, no distress  HEENT: NC/AT, supple, trachea midline, no goiter  CV: well perfused  Lungs: non-labored breathing  Breast: not performed  Neuro: normal gait, balance  Skin: no lesions, visible rashes/bruising  Psych: appropriate mood/affect  Abdomen: Benign, No masses, organomegaly, No inguinal nodes, and Soft, non-tender.   Pelvic:       - Ext: Vulva and perineum are normal without lesion, mass or discharge.  Gonorrhea chlamydia swabs and wet prep swab collected for STI screening        RADS  None for review       LABS  None for review         A/P:     Diagnosis Comments   1. Abnormal uterine bleeding  Heavy vaginal bleeding with.  As well as small spotting associated with pain as described above.  Will obtain pelvic ultrasound.  Also will start patient on minipill after ultrasound is complete and attempt to improve her pain symptoms as well as her bleeding profile.  If bleeding continues will obtain labs consideration for endometrial biopsy.  Do not recommend estrogen containing products today due to elevated blood pressure.  Recommend patient follow-up with primary care for management of blood pressure.  Follow-up in 2 to 3 months to reassess bleeding and pain.  Patient in agreement with plan of care.    US Pelvic Complete with Transvaginal, norethindrone (MICRONOR) 0.35 MG tablet       2. Routine screening for STI (sexually transmitted infection)  Patient with new partner desiring routine STI screening.  Swabs collected and blood work ordered.    Wet prep - Clinic Collect, Chlamydia & Gonorrhea by PCR, GICH/Range - Clinic Collect, Hepatitis B surface antigen, Hepatitis C antibody, HIV Antigen Antibody Combo Cascade, Treponema Abs w Reflex to RPR and Titer       3. Pelvic pain in female          4.  Vaginal candidiasis: Wet prep positive for yeast.  Will treat due to pelvic pain as above.  diflucan ordered          30 min spent on the date of the encounter in chart review, patient visit, review of tests, documentation and/or discussion with other providers about the issues documented above.     Robby Lucero DO, FACOG

## 2024-04-02 LAB
C TRACH DNA SPEC QL PROBE+SIG AMP: NEGATIVE
HBV SURFACE AG SERPL QL IA: NONREACTIVE
HCV AB SERPL QL IA: NONREACTIVE
HIV 1+2 AB+HIV1 P24 AG SERPL QL IA: NONREACTIVE
N GONORRHOEA DNA SPEC QL NAA+PROBE: NEGATIVE
T PALLIDUM AB SER QL: NONREACTIVE

## 2024-04-24 ENCOUNTER — ANCILLARY PROCEDURE (OUTPATIENT)
Dept: ULTRASOUND IMAGING | Facility: CLINIC | Age: 34
End: 2024-04-24
Attending: GENERAL PRACTICE
Payer: COMMERCIAL

## 2024-04-24 ENCOUNTER — MYC MEDICAL ADVICE (OUTPATIENT)
Dept: OBGYN | Facility: CLINIC | Age: 34
End: 2024-04-24

## 2024-04-24 DIAGNOSIS — N93.9 ABNORMAL UTERINE BLEEDING: ICD-10-CM

## 2024-04-24 DIAGNOSIS — R10.2 PELVIC PAIN IN FEMALE: ICD-10-CM

## 2024-04-24 PROCEDURE — 76830 TRANSVAGINAL US NON-OB: CPT | Mod: TC | Performed by: RADIOLOGY

## 2024-04-24 PROCEDURE — 76856 US EXAM PELVIC COMPLETE: CPT | Mod: TC | Performed by: RADIOLOGY

## 2024-05-03 DIAGNOSIS — N85.8 UTERINE MASS: Primary | ICD-10-CM

## 2024-05-17 ENCOUNTER — HOSPITAL ENCOUNTER (OUTPATIENT)
Dept: MRI IMAGING | Facility: CLINIC | Age: 34
Discharge: HOME | End: 2024-05-17
Attending: GENERAL PRACTICE | Admitting: GENERAL PRACTICE
Payer: COMMERCIAL

## 2024-05-17 DIAGNOSIS — N85.8 UTERINE MASS: ICD-10-CM

## 2024-05-17 PROCEDURE — A9585 GADOBUTROL INJECTION: HCPCS | Performed by: GENERAL PRACTICE

## 2024-05-17 PROCEDURE — 72197 MRI PELVIS W/O & W/DYE: CPT

## 2024-05-17 PROCEDURE — 255N000002 HC RX 255 OP 636: Performed by: GENERAL PRACTICE

## 2024-05-17 RX ORDER — GADOBUTROL 604.72 MG/ML
6 INJECTION INTRAVENOUS ONCE
Status: COMPLETED | OUTPATIENT
Start: 2024-05-17 | End: 2024-05-17

## 2024-05-17 RX ADMIN — GADOBUTROL 6 ML: 604.72 INJECTION INTRAVENOUS at 19:46

## 2024-11-17 ENCOUNTER — HEALTH MAINTENANCE LETTER (OUTPATIENT)
Age: 34
End: 2024-11-17

## 2025-04-07 ENCOUNTER — OFFICE VISIT (OUTPATIENT)
Dept: FAMILY MEDICINE | Facility: OTHER | Age: 35
End: 2025-04-07
Payer: COMMERCIAL

## 2025-04-07 VITALS
DIASTOLIC BLOOD PRESSURE: 90 MMHG | OXYGEN SATURATION: 100 % | HEART RATE: 67 BPM | TEMPERATURE: 97.5 F | SYSTOLIC BLOOD PRESSURE: 144 MMHG | RESPIRATION RATE: 13 BRPM | HEIGHT: 61 IN | WEIGHT: 147 LBS | BODY MASS INDEX: 27.75 KG/M2

## 2025-04-07 DIAGNOSIS — I10 BENIGN ESSENTIAL HYPERTENSION: ICD-10-CM

## 2025-04-07 DIAGNOSIS — F41.1 GENERALIZED ANXIETY DISORDER: ICD-10-CM

## 2025-04-07 DIAGNOSIS — Z00.00 ROUTINE GENERAL MEDICAL EXAMINATION AT A HEALTH CARE FACILITY: Primary | ICD-10-CM

## 2025-04-07 DIAGNOSIS — Z71.6 TOBACCO ABUSE COUNSELING: ICD-10-CM

## 2025-04-07 DIAGNOSIS — F33.1 MODERATE EPISODE OF RECURRENT MAJOR DEPRESSIVE DISORDER (H): ICD-10-CM

## 2025-04-07 DIAGNOSIS — Z12.4 CERVICAL CANCER SCREENING: ICD-10-CM

## 2025-04-07 LAB
ANION GAP SERPL CALCULATED.3IONS-SCNC: 7 MMOL/L (ref 7–15)
BUN SERPL-MCNC: 8.1 MG/DL (ref 6–20)
CALCIUM SERPL-MCNC: 9.6 MG/DL (ref 8.8–10.4)
CHLORIDE SERPL-SCNC: 102 MMOL/L (ref 98–107)
CREAT SERPL-MCNC: 0.76 MG/DL (ref 0.51–0.95)
EGFRCR SERPLBLD CKD-EPI 2021: >90 ML/MIN/1.73M2
GLUCOSE SERPL-MCNC: 90 MG/DL (ref 70–99)
HCO3 SERPL-SCNC: 27 MMOL/L (ref 22–29)
POTASSIUM SERPL-SCNC: 4.1 MMOL/L (ref 3.4–5.3)
SODIUM SERPL-SCNC: 136 MMOL/L (ref 135–145)

## 2025-04-07 PROCEDURE — 1126F AMNT PAIN NOTED NONE PRSNT: CPT | Performed by: FAMILY MEDICINE

## 2025-04-07 PROCEDURE — 3080F DIAST BP >= 90 MM HG: CPT | Performed by: FAMILY MEDICINE

## 2025-04-07 PROCEDURE — 3077F SYST BP >= 140 MM HG: CPT | Performed by: FAMILY MEDICINE

## 2025-04-07 PROCEDURE — 99395 PREV VISIT EST AGE 18-39: CPT | Performed by: FAMILY MEDICINE

## 2025-04-07 PROCEDURE — 80048 BASIC METABOLIC PNL TOTAL CA: CPT | Performed by: FAMILY MEDICINE

## 2025-04-07 PROCEDURE — 86706 HEP B SURFACE ANTIBODY: CPT | Performed by: FAMILY MEDICINE

## 2025-04-07 PROCEDURE — 87624 HPV HI-RISK TYP POOLED RSLT: CPT | Performed by: FAMILY MEDICINE

## 2025-04-07 PROCEDURE — 36415 COLL VENOUS BLD VENIPUNCTURE: CPT | Performed by: FAMILY MEDICINE

## 2025-04-07 PROCEDURE — G0145 SCR C/V CYTO,THINLAYER,RESCR: HCPCS | Performed by: FAMILY MEDICINE

## 2025-04-07 PROCEDURE — 99214 OFFICE O/P EST MOD 30 MIN: CPT | Mod: 25 | Performed by: FAMILY MEDICINE

## 2025-04-07 RX ORDER — BUPROPION HYDROCHLORIDE 150 MG/1
150 TABLET ORAL EVERY MORNING
Qty: 30 TABLET | Refills: 1 | Status: SHIPPED | OUTPATIENT
Start: 2025-04-07

## 2025-04-07 SDOH — HEALTH STABILITY: PHYSICAL HEALTH: ON AVERAGE, HOW MANY DAYS PER WEEK DO YOU ENGAGE IN MODERATE TO STRENUOUS EXERCISE (LIKE A BRISK WALK)?: 3 DAYS

## 2025-04-07 ASSESSMENT — ANXIETY QUESTIONNAIRES
GAD7 TOTAL SCORE: 4
GAD7 TOTAL SCORE: 4
6. BECOMING EASILY ANNOYED OR IRRITABLE: SEVERAL DAYS
7. FEELING AFRAID AS IF SOMETHING AWFUL MIGHT HAPPEN: NOT AT ALL
7. FEELING AFRAID AS IF SOMETHING AWFUL MIGHT HAPPEN: NOT AT ALL
4. TROUBLE RELAXING: SEVERAL DAYS
1. FEELING NERVOUS, ANXIOUS, OR ON EDGE: SEVERAL DAYS
2. NOT BEING ABLE TO STOP OR CONTROL WORRYING: SEVERAL DAYS
GAD7 TOTAL SCORE: 4
3. WORRYING TOO MUCH ABOUT DIFFERENT THINGS: NOT AT ALL
8. IF YOU CHECKED OFF ANY PROBLEMS, HOW DIFFICULT HAVE THESE MADE IT FOR YOU TO DO YOUR WORK, TAKE CARE OF THINGS AT HOME, OR GET ALONG WITH OTHER PEOPLE?: SOMEWHAT DIFFICULT
IF YOU CHECKED OFF ANY PROBLEMS ON THIS QUESTIONNAIRE, HOW DIFFICULT HAVE THESE PROBLEMS MADE IT FOR YOU TO DO YOUR WORK, TAKE CARE OF THINGS AT HOME, OR GET ALONG WITH OTHER PEOPLE: SOMEWHAT DIFFICULT
5. BEING SO RESTLESS THAT IT IS HARD TO SIT STILL: NOT AT ALL

## 2025-04-07 ASSESSMENT — PATIENT HEALTH QUESTIONNAIRE - PHQ9
SUM OF ALL RESPONSES TO PHQ QUESTIONS 1-9: 4
10. IF YOU CHECKED OFF ANY PROBLEMS, HOW DIFFICULT HAVE THESE PROBLEMS MADE IT FOR YOU TO DO YOUR WORK, TAKE CARE OF THINGS AT HOME, OR GET ALONG WITH OTHER PEOPLE: SOMEWHAT DIFFICULT
SUM OF ALL RESPONSES TO PHQ QUESTIONS 1-9: 4

## 2025-04-07 ASSESSMENT — PAIN SCALES - GENERAL: PAINLEVEL_OUTOF10: NO PAIN (0)

## 2025-04-07 ASSESSMENT — SOCIAL DETERMINANTS OF HEALTH (SDOH): HOW OFTEN DO YOU GET TOGETHER WITH FRIENDS OR RELATIVES?: ONCE A WEEK

## 2025-04-07 NOTE — PROGRESS NOTES
Preventive Care Visit  [facility]  Chioma Bob MD, MD, Family Medicine  Apr 7, 2025      Assessment & Plan         ICD-10-CM    1. Routine general medical examination at a health care facility  Z00.00 Hepatitis B Surface Antibody     Hepatitis B Surface Antibody      2. Moderate episode of recurrent major depressive disorder (H)  F33.1       3. Generalized anxiety disorder  F41.1 Basic metabolic panel  (Ca, Cl, CO2, Creat, Gluc, K, Na, BUN)     Basic metabolic panel  (Ca, Cl, CO2, Creat, Gluc, K, Na, BUN)      4. Benign essential hypertension  I10 Home Blood Pressure Monitor Order for DME - ONLY FOR DME      5. Cervical cancer screening  Z12.4 HPV and Gynecologic Cytology Panel - Recommended Age 30 - 65 Years      6. Tobacco abuse counseling  Z71.6 buPROPion (WELLBUTRIN XL) 150 MG 24 hr tablet          Consent was obtained from the patient to use an AI documentation tool in the creation of this note.    Assessment & Plan  Routine general medical examination at a health care facility:  - Conduct routine Pap smear. Perform lab work including kidney panel and blood sugar screening. Check for immunity to hepatitis B if desired. If immunity is not confirmed, consider completing the vaccination series.    Moderate episode of recurrent major depressive disorder (H):  - Consider Wellbutrin for smoking cessation, which may also aid in managing depressive symptoms. Monitor for any changes in mood or depressive symptoms.    Generalized anxiety disorder:  - Anxiety contributing to elevated blood pressure readings. Encourage lifestyle changes including increased cardio exercise and stress management. Consider beta blockers if medication is needed for anxiety-related blood pressure elevation. Discuss potential benefits and side effects of beta blockers with the patient.    Benign essential hypertension:  - Elevated blood pressure likely influenced by anxiety and lifestyle factors. Monitor blood pressure at  "home. Implement lifestyle changes including increased cardio exercise, reduced nicotine and caffeine intake. Consider medication if blood pressure remains elevated after lifestyle modifications. Discuss medication options such as beta blockers, hydrochlorothiazide, or lisinopril, depending on lab results and patient preference. Preference hctz, then lisinopril if able.    Cervical cancer screening:  - Due for routine Pap smear. Perform Pap smear during this visit. Discuss the importance of regular screenings and follow-up.    Tobacco abuse counseling:  - Tobacco use contributing to health concerns. Initiate Wellbutrin with a planned quit date after one week. Encourage participation in Quit Plan [place] program for additional support. Discuss potential withdrawal symptoms and coping strategies.      I spent a total of 32 minutes on the day of the visit.   Time spent by me today doing chart review, history and exam, documentation and further activities per the note    Chioma Bob MD     Patient has been advised of split billing requirements and indicates understanding: Yes        BMI  Estimated body mass index is 27.75 kg/m  as calculated from the following:    Height as of this encounter: 1.55 m (5' 1.02\").    Weight as of this encounter: 66.7 kg (147 lb).   Weight management plan: Discussed healthy diet and exercise guidelines    Counseling  Appropriate preventive services were addressed with this patient via screening, questionnaire, or discussion as appropriate for fall prevention, nutrition, physical activity, Tobacco-use cessation, social engagement, weight loss and cognition.  Checklist reviewing preventive services available has been given to the patient.  Reviewed patient's diet, addressing concerns and/or questions.   She is at risk for lack of exercise and has been provided with information to increase physical activity for the benefit of her well-being.   She is at risk for psychosocial distress and has " been provided with information to reduce risk.           Subjective   Wilma is a 34 year old, presenting for the following:  Physical        4/7/2025    10:40 AM   Additional Questions   Roomed by joey   Accompanied by self          HPIAnswers submitted by the patient for this visit:  Patient Health Questionnaire (Submitted on 4/7/2025)  If you checked off any problems, how difficult have these problems made it for you to do your work, take care of things at home, or get along with other people?: Somewhat difficult  PHQ9 TOTAL SCORE: 4  Patient Health Questionnaire (G7) (Submitted on 4/7/2025)  NEGAR 7 TOTAL SCORE: 4    - Has been avoiding blood pressure checks due to anxiety and stress.  - Reports being a very anxious person, which affects her willingness to monitor blood pressure.  - Experiences stress from working long hours and purchasing a house.  - Blood pressure was high during a previous urgent care visit for pink eye.  - Has a history of heavy bleeding due to fibroids and hemorrhagic cysts, previously managed with progesterone.  - Stopped taking progesterone due to inconsistent use and resulting withdrawal bleeding.  - Previously had an abnormal Pap smear, but recent ones have been normal.  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  - Has a history of substituting smoking with vaping, finds vaping more convenient and potentially more harmful.        Advance Care Planning  Patient does not have a Health Care Directive: Discussed advance care planning with patient; information given to patient to review.      4/7/2025   General Health   How would you rate your overall physical health? (!) FAIR   Feel stress (tense, anxious, or unable to sleep) To some extent   (!) STRESS CONCERN      4/7/2025   Nutrition   Three or more servings of calcium each day? Yes   Diet: Regular (no restrictions)   How many servings of fruit and vegetables per day? (!) 2-3   How many sweetened beverages each  day? 0-1         2025   Exercise   Days per week of moderate/strenous exercise 3 days         2025   Social Factors   Frequency of gathering with friends or relatives Once a week   Worry food won't last until get money to buy more No   Food not last or not have enough money for food? No   Do you have housing? (Housing is defined as stable permanent housing and does not include staying ouside in a car, in a tent, in an abandoned building, in an overnight shelter, or couch-surfing.) Yes   Are you worried about losing your housing? No   Lack of transportation? No   Unable to get utilities (heat,electricity)? No         2025   Dental   Dentist two times every year? Yes         Today's PHQ-9 Score:       2025    10:17 AM   PHQ-9 SCORE   PHQ-9 Total Score MyChart 4 (Minimal depression)   PHQ-9 Total Score 4        Patient-reported         2025   Substance Use   Alcohol more than 3/day or more than 7/wk No   Do you use any other substances recreationally? No     Social History     Tobacco Use    Smoking status: Former     Current packs/day: 0.00     Types: Cigarettes     Quit date: 2011     Years since quittin.1    Smokeless tobacco: Never   Substance Use Topics    Alcohol use: Yes     Comment: see note     Drug use: No           2021   LAST FHS-7 RESULTS   1st degree relative breast or ovarian cancer No    Any relative bilateral breast cancer No    Any male have breast cancer No    Any ONE woman have BOTH breast AND ovarian cancer No    Any woman with breast cancer before 50yrs Unknown    2 or more relatives with breast AND/OR ovarian cancer Unknown    2 or more relatives with breast AND/OR bowel cancer Unknown        Proxy-reported        Mammogram Screening - Patient under 40 years of age: Routine Mammogram Screening not recommended.         2025   STI Screening   New sexual partner(s) since last STI/HIV test? No     History of abnormal Pap smear: No - age 30- 64 PAP with HPV every  "5 years recommended        Latest Ref Rng & Units 12/18/2019     3:00 PM 12/18/2019     2:59 PM 4/4/2011    12:00 AM   PAP / HPV   PAP (Historical)   NIL  ASC-US    HPV 16 DNA NEG^Negative Negative      HPV 18 DNA NEG^Negative Negative      Other HR HPV NEG^Negative Negative              4/7/2025   Contraception/Family Planning   Questions about contraception or family planning No        Reviewed and updated as needed this visit by Provider   Tobacco  Allergies  Meds  Problems  Med Hx  Surg Hx  Fam Hx                  Review of Systems  Constitutional, HEENT, cardiovascular, pulmonary, GI, , musculoskeletal, neuro, skin, endocrine and psych systems are negative, except as otherwise noted.     Objective    Exam  BP (!) 144/90   Pulse 67   Temp 97.5  F (36.4  C) (Temporal)   Resp 13   Ht 1.55 m (5' 1.02\")   Wt 66.7 kg (147 lb)   LMP 03/13/2025 (Approximate)   SpO2 100%   BMI 27.75 kg/m     Estimated body mass index is 27.75 kg/m  as calculated from the following:    Height as of this encounter: 1.55 m (5' 1.02\").    Weight as of this encounter: 66.7 kg (147 lb).    Physical Exam  GENERAL: alert and no distress  EYES: Eyes grossly normal to inspection, PERRL and conjunctivae and sclerae normal  HENT: ear canals and TM's normal, nose and mouth without ulcers or lesions  NECK: no adenopathy, no asymmetry, masses, or scars  RESP: lungs clear to auscultation - no rales, rhonchi or wheezes  BREAST: breast reduction with 2 small nodules along the breast incision line bilaterally  CV: regular rate and rhythm, normal S1 S2, no S3 or S4, no murmur, click or rub, no peripheral edema  ABDOMEN: soft, nontender, no hepatosplenomegaly, no masses and bowel sounds normal   (female) w/bimanual: normal female external genitalia, normal urethral meatus, normal vaginal mucosa, and normal cervix/adnexa/uterus without masses or discharge  MS: no gross musculoskeletal defects noted, no edema  SKIN: no suspicious lesions or " louise  NEURO: Normal strength and tone, mentation intact and speech normal  PSYCH: mentation appears normal, affect normal/bright        Signed Electronically by: Chioma Bob MD, MD

## 2025-04-07 NOTE — PATIENT INSTRUCTIONS
Based on our discussion, I have outlined the following instructions for you:      - Schedule a routine Pap smear test.  - Get lab tests done to check your kidney function and blood sugar levels.  - You can choose to check if you are immune to hepatitis B. If not, think about getting the vaccination series.  - Consider using Wellbutrin to help you quit smoking, which might also help with your depression.  - Keep an eye on any changes in your mood or feelings of depression.  - Try to make lifestyle changes like doing more cardio exercises and finding ways to manage stress.  - If your anxiety is causing high blood pressure, think about using beta blockers as a medication option.  - Talk about the benefits and possible side effects of beta blockers.  - Check your blood pressure at home regularly.  - Make lifestyle changes like doing more cardio exercises and cutting down on nicotine and caffeine.  - If your blood pressure is still high after making these changes, think about taking medication.  - Discuss medication options like beta blockers, hydrochlorothiazide, or lisinopril based on your lab results and what you prefer.  - Get your Pap smear done during this visit.  - Talk about why regular screenings and follow-ups are important.  - Start taking Wellbutrin and set a quit date for smoking one week from now.  - Join the [employer] [place] program for extra support in quitting smoking.  - Discuss what withdrawal symptoms you might experience and how to handle them.    Thank you again for your visit, and we look forward to supporting you in your journey to better health.    Patient Education   Preventive Care Advice   This is general advice given by our system to help you stay healthy. However, your care team may have specific advice just for you. Please talk to your care team about your preventive care needs.  Nutrition  Eat 5 or more servings of fruits and vegetables each day.  Try wheat bread, brown rice and whole  grain pasta (instead of white bread, rice, and pasta).  Get enough calcium and vitamin D. Check the label on foods and aim for 100% of the RDA (recommended daily allowance).  Lifestyle  Exercise at least 150 minutes each week  (30 minutes a day, 5 days a week).  Do muscle strengthening activities 2 days a week. These help control your weight and prevent disease.  No smoking.  Wear sunscreen to prevent skin cancer.  Have a dental exam and cleaning every 6 months.  Yearly exams  See your health care team every year to talk about:  Any changes in your health.  Any medicines your care team has prescribed.  Preventive care, family planning, and ways to prevent chronic diseases.  Shots (vaccines)   HPV shots (up to age 26), if you've never had them before.  Hepatitis B shots (up to age 59), if you've never had them before.  COVID-19 shot: Get this shot when it's due.  Flu shot: Get a flu shot every year.  Tetanus shot: Get a tetanus shot every 10 years.  Pneumococcal, hepatitis A, and RSV shots: Ask your care team if you need these based on your risk.  Shingles shot (for age 50 and up)  General health tests  Diabetes screening:  Starting at age 35, Get screened for diabetes at least every 3 years.  If you are younger than age 35, ask your care team if you should be screened for diabetes.  Cholesterol test: At age 39, start having a cholesterol test every 5 years, or more often if advised.  Bone density scan (DEXA): At age 50, ask your care team if you should have this scan for osteoporosis (brittle bones).  Hepatitis C: Get tested at least once in your life.  STIs (sexually transmitted infections)  Before age 24: Ask your care team if you should be screened for STIs.  After age 24: Get screened for STIs if you're at risk. You are at risk for STIs (including HIV) if:  You are sexually active with more than one person.  You don't use condoms every time.  You or a partner was diagnosed with a sexually transmitted  infection.  If you are at risk for HIV, ask about PrEP medicine to prevent HIV.  Get tested for HIV at least once in your life, whether you are at risk for HIV or not.  Cancer screening tests  Cervical cancer screening: If you have a cervix, begin getting regular cervical cancer screening tests starting at age 21.  Breast cancer scan (mammogram): If you've ever had breasts, begin having regular mammograms starting at age 40. This is a scan to check for breast cancer.  Colon cancer screening: It is important to start screening for colon cancer at age 45.  Have a colonoscopy test every 10 years (or more often if you're at risk) Or, ask your provider about stool tests like a FIT test every year or Cologuard test every 3 years.  To learn more about your testing options, visit:   .  For help making a decision, visit:   https://bit.ly/qf44402.  Prostate cancer screening test: If you have a prostate, ask your care team if a prostate cancer screening test (PSA) at age 55 is right for you.  Lung cancer screening: If you are a current or former smoker ages 50 to 80, ask your care team if ongoing lung cancer screenings are right for you.  For informational purposes only. Not to replace the advice of your health care provider. Copyright   2023 [facility]. All rights reserved. Clinically reviewed by the [facility]. everbill 331353 - REV 01/24.  Learning About Stress  What is stress?     Stress is your body's response to a hard situation. Your body can have a physical, emotional, or mental response. Stress is a fact of life for most people, and it affects everyone differently. What causes stress for you may not be stressful for someone else.  A lot of things can cause stress. You may feel stress when you go on a job interview, take a test, or run a race. This kind of short-term stress is normal and even useful. It can help you if you need to work hard or react quickly. For example,  stress can help you finish an important job on time.  Long-term stress is caused by ongoing stressful situations or events. Examples of long-term stress include long-term health problems, ongoing problems at work, or conflicts in your family. Long-term stress can harm your health.  How does stress affect your health?  When you are stressed, your body responds as though you are in danger. It makes hormones that speed up your heart, make you breathe faster, and give you a burst of energy. This is called the fight-or-flight stress response. If the stress is over quickly, your body goes back to normal and no harm is done.  But if stress happens too often or lasts too long, it can have bad effects. Long-term stress can make you more likely to get sick, and it can make symptoms of some diseases worse. If you tense up when you are stressed, you may develop neck, shoulder, or low back pain. Stress is linked to high blood pressure and heart disease.  Stress also harms your emotional health. It can make you calle, tense, or depressed. Your relationships may suffer, and you may not do well at work or school.  What can you do to manage stress?  You can try these things to help manage stress:   Do something active. Exercise or activity can help reduce stress. Walking is a great way to get started. Even everyday activities such as housecleaning or yard work can help.  Try yoga or yi chi. These techniques combine exercise and meditation. You may need some training at first to learn them.  Do something you enjoy. For example, listen to music or go to a movie. Practice your hobby or do volunteer work.  Meditate. This can help you relax, because you are not worrying about what happened before or what may happen in the future.  Do guided imagery. Imagine yourself in any setting that helps you feel calm. You can use online videos, books, or a teacher to guide you.  Do breathing exercises. For example:  From a standing position, bend  "forward from the waist with your knees slightly bent. Let your arms dangle close to the floor.  Breathe in slowly and deeply as you return to a standing position. Roll up slowly and lift your head last.  Hold your breath for just a few seconds in the standing position.  Breathe out slowly and bend forward from the waist.  Let your feelings out. Talk, laugh, cry, and express anger when you need to. Talking with supportive friends or family, a counselor, or a [employer]én leader about your feelings is a healthy way to relieve stress. Avoid discussing your feelings with people who make you feel worse.  Write. It may help to write about things that are bothering you. This helps you find out how much stress you feel and what is causing it. When you know this, you can find better ways to cope.  What can you do to prevent stress?  You might try some of these things to help prevent stress:  Manage your time. This helps you find time to do the things you want and need to do.  Get enough sleep. Your body recovers from the stresses of the day while you are sleeping.  Get support. Your family, friends, and community can make a difference in how you experience stress.  Limit your news feed. Avoid or limit time on social media or news that may make you feel stressed.  Do something active. Exercise or activity can help reduce stress. Walking is a great way to get started.  Where can you learn more?  Go to https://www.GTx.net/patiented  Enter N032 in the search box to learn more about \"Learning About Stress.\"  Current as of: October 24, 2024  Content Version: 14.4    6447-6662 [employer].   Care instructions adapted under license by your healthcare professional. If you have questions about a medical condition or this instruction, always ask your healthcare professional. [employer] disclaims any warranty or liability for your use of this information.       "

## 2025-04-08 LAB
HBV SURFACE AB SERPL IA-ACNC: 5.45 M[IU]/ML
HBV SURFACE AB SERPL IA-ACNC: NONREACTIVE M[IU]/ML
HPV HR 12 DNA CVX QL NAA+PROBE: NEGATIVE
HPV16 DNA CVX QL NAA+PROBE: NEGATIVE
HPV18 DNA CVX QL NAA+PROBE: NEGATIVE
HUMAN PAPILLOMA VIRUS FINAL DIAGNOSIS: NORMAL

## 2025-04-10 LAB
BKR AP ASSOCIATED HPV REPORT: NORMAL
BKR LAB AP GYN ADEQUACY: NORMAL
BKR LAB AP GYN INTERPRETATION: NORMAL
BKR LAB AP LMP: NORMAL
BKR LAB AP PREVIOUS ABNORMAL: NORMAL
PATH REPORT.COMMENTS IMP SPEC: NORMAL
PATH REPORT.COMMENTS IMP SPEC: NORMAL
PATH REPORT.RELEVANT HX SPEC: NORMAL

## (undated) DEVICE — LINEN TOWEL PACK X5 5464

## (undated) DEVICE — SUCTION MANIFOLD DORNOCH ULTRA CART UL-CL500

## (undated) DEVICE — SU VICRYL 2-0 CT-1 27" UND J259H

## (undated) DEVICE — ESU GROUND PAD UNIVERSAL W/O CORD

## (undated) DEVICE — GLOVE PROTEXIS BLUE W/NEU-THERA 6.5  2D73EB65

## (undated) DEVICE — SOL WATER IRRIG 1000ML BOTTLE 2F7114

## (undated) DEVICE — SYR BULB IRRIG 50ML LATEX FREE 0035280

## (undated) DEVICE — TUBING SUCTION MEDI-VAC 1/4"X20' N620A

## (undated) DEVICE — APPLICATORS COTTON TIP 6"X2 STERILE LF C15053-006

## (undated) DEVICE — ESU PENCIL W/HOLSTER E2350H

## (undated) DEVICE — PAD CHUX UNDERPAD 30X36" P3036C

## (undated) DEVICE — NDL COUNTER 20CT 31142493

## (undated) DEVICE — Device

## (undated) DEVICE — SU VICRYL 3-0 PS-2 18" UND J497G

## (undated) DEVICE — SUCTION TIP YANKAUER W/O VENT K86

## (undated) DEVICE — SU VICRYL 3-0 SH 27" J316H

## (undated) DEVICE — SU VICRYL 4-0 PS-2 18" UND J496H

## (undated) DEVICE — DECANTER TRANSFER DEVICE 2008S

## (undated) DEVICE — GLOVE PROTEXIS W/NEU-THERA 6.0  2D73TE60

## (undated) DEVICE — LINEN GOWN X4 5410

## (undated) DEVICE — SOL NACL 0.9% IRRIG 1000ML BOTTLE 2F7124

## (undated) DEVICE — BLADE KNIFE SURG 15 371115

## (undated) DEVICE — LIGHT HANDLE X2

## (undated) DEVICE — GOWN XLG DISP 9545

## (undated) DEVICE — ESU ELEC NDL 1" E1552

## (undated) RX ORDER — FENTANYL CITRATE 50 UG/ML
INJECTION, SOLUTION INTRAMUSCULAR; INTRAVENOUS
Status: DISPENSED
Start: 2020-07-08

## (undated) RX ORDER — ACETAMINOPHEN 325 MG/1
TABLET ORAL
Status: DISPENSED
Start: 2020-07-08

## (undated) RX ORDER — CEFAZOLIN SODIUM 1 G/3ML
INJECTION, POWDER, FOR SOLUTION INTRAMUSCULAR; INTRAVENOUS
Status: DISPENSED
Start: 2020-07-08

## (undated) RX ORDER — KETOROLAC TROMETHAMINE 30 MG/ML
INJECTION, SOLUTION INTRAMUSCULAR; INTRAVENOUS
Status: DISPENSED
Start: 2020-07-08

## (undated) RX ORDER — ONDANSETRON 2 MG/ML
INJECTION INTRAMUSCULAR; INTRAVENOUS
Status: DISPENSED
Start: 2020-07-08